# Patient Record
Sex: FEMALE | Race: WHITE | Employment: OTHER | URBAN - METROPOLITAN AREA
[De-identification: names, ages, dates, MRNs, and addresses within clinical notes are randomized per-mention and may not be internally consistent; named-entity substitution may affect disease eponyms.]

---

## 2018-09-07 ENCOUNTER — HOSPITAL ENCOUNTER (EMERGENCY)
Facility: HOSPITAL | Age: 68
Discharge: HOME | End: 2018-09-07
Attending: EMERGENCY MEDICINE
Payer: MEDICARE

## 2018-09-07 ENCOUNTER — APPOINTMENT (OUTPATIENT)
Dept: GENERAL RADIOLOGY | Facility: HOSPITAL | Age: 68
End: 2018-09-07
Attending: EMERGENCY MEDICINE
Payer: MEDICARE

## 2018-09-07 VITALS
HEIGHT: 59 IN | HEART RATE: 106 BPM | TEMPERATURE: 100 F | WEIGHT: 115 LBS | OXYGEN SATURATION: 92 % | RESPIRATION RATE: 21 BRPM | DIASTOLIC BLOOD PRESSURE: 60 MMHG | BODY MASS INDEX: 23.18 KG/M2 | SYSTOLIC BLOOD PRESSURE: 111 MMHG

## 2018-09-07 DIAGNOSIS — J18.9 COMMUNITY ACQUIRED PNEUMONIA OF RIGHT UPPER LOBE OF LUNG: Primary | ICD-10-CM

## 2018-09-07 LAB
ANION GAP SERPL CALC-SCNC: 13 MMOL/L (ref 0–18)
BASOPHILS # BLD: 0 K/UL (ref 0–0.2)
BASOPHILS NFR BLD: 1 %
BUN SERPL-MCNC: 10 MG/DL (ref 8–20)
BUN/CREAT SERPL: 12.2 (ref 10–20)
CALCIUM SERPL-MCNC: 9 MG/DL (ref 8.5–10.5)
CHLORIDE SERPL-SCNC: 94 MMOL/L (ref 95–110)
CO2 SERPL-SCNC: 23 MMOL/L (ref 22–32)
CREAT SERPL-MCNC: 0.82 MG/DL (ref 0.5–1.5)
EOSINOPHIL # BLD: 0 K/UL (ref 0–0.7)
EOSINOPHIL NFR BLD: 0 %
ERYTHROCYTE [DISTWIDTH] IN BLOOD BY AUTOMATED COUNT: 13 % (ref 11–15)
GLUCOSE SERPL-MCNC: 113 MG/DL (ref 70–99)
HCT VFR BLD AUTO: 42.5 % (ref 35–48)
HGB BLD-MCNC: 14.2 G/DL (ref 12–16)
LACTATE SERPL-SCNC: 2 MMOL/L (ref 0.5–2.2)
LYMPHOCYTES # BLD: 0.4 K/UL (ref 1–4)
LYMPHOCYTES NFR BLD: 6 %
MCH RBC QN AUTO: 30.5 PG (ref 27–32)
MCHC RBC AUTO-ENTMCNC: 33.3 G/DL (ref 32–37)
MCV RBC AUTO: 91.4 FL (ref 80–100)
MONOCYTES # BLD: 0.3 K/UL (ref 0–1)
MONOCYTES NFR BLD: 4 %
NEUTROPHILS # BLD AUTO: 5.6 K/UL (ref 1.8–7.7)
NEUTROPHILS NFR BLD: 89 %
OSMOLALITY UR CALC.SUM OF ELEC: 270 MOSM/KG (ref 275–295)
PLATELET # BLD AUTO: 113 K/UL (ref 140–400)
PMV BLD AUTO: 9.7 FL (ref 7.4–10.3)
POTASSIUM SERPL-SCNC: 3.9 MMOL/L (ref 3.3–5.1)
RBC # BLD AUTO: 4.65 M/UL (ref 3.7–5.4)
SODIUM SERPL-SCNC: 130 MMOL/L (ref 136–144)
WBC # BLD AUTO: 6.3 K/UL (ref 4–11)

## 2018-09-07 PROCEDURE — 83605 ASSAY OF LACTIC ACID: CPT | Performed by: EMERGENCY MEDICINE

## 2018-09-07 PROCEDURE — 71045 X-RAY EXAM CHEST 1 VIEW: CPT | Performed by: EMERGENCY MEDICINE

## 2018-09-07 PROCEDURE — 87040 BLOOD CULTURE FOR BACTERIA: CPT | Performed by: EMERGENCY MEDICINE

## 2018-09-07 PROCEDURE — 93010 ELECTROCARDIOGRAM REPORT: CPT | Performed by: EMERGENCY MEDICINE

## 2018-09-07 PROCEDURE — 96365 THER/PROPH/DIAG IV INF INIT: CPT

## 2018-09-07 PROCEDURE — 80048 BASIC METABOLIC PNL TOTAL CA: CPT | Performed by: EMERGENCY MEDICINE

## 2018-09-07 PROCEDURE — 96361 HYDRATE IV INFUSION ADD-ON: CPT

## 2018-09-07 PROCEDURE — 93005 ELECTROCARDIOGRAM TRACING: CPT

## 2018-09-07 PROCEDURE — 85025 COMPLETE CBC W/AUTO DIFF WBC: CPT | Performed by: EMERGENCY MEDICINE

## 2018-09-07 PROCEDURE — 99291 CRITICAL CARE FIRST HOUR: CPT

## 2018-09-07 RX ORDER — ACETAMINOPHEN 500 MG
1000 TABLET ORAL ONCE
Status: COMPLETED | OUTPATIENT
Start: 2018-09-07 | End: 2018-09-07

## 2018-09-07 RX ORDER — IBUPROFEN 600 MG/1
600 TABLET ORAL EVERY 8 HOURS PRN
Qty: 20 TABLET | Refills: 0 | Status: ON HOLD | OUTPATIENT
Start: 2018-09-07 | End: 2018-09-09

## 2018-09-07 RX ORDER — GUAIFENESIN 600 MG
1200 TABLET, EXTENDED RELEASE 12 HR ORAL 2 TIMES DAILY
Qty: 20 TABLET | Refills: 0 | Status: ON HOLD | OUTPATIENT
Start: 2018-09-07 | End: 2018-09-09

## 2018-09-07 RX ORDER — IBUPROFEN 600 MG/1
600 TABLET ORAL ONCE
Status: COMPLETED | OUTPATIENT
Start: 2018-09-07 | End: 2018-09-07

## 2018-09-07 RX ORDER — AZITHROMYCIN 500 MG/1
500 TABLET, FILM COATED ORAL DAILY
Qty: 4 TABLET | Refills: 0 | Status: ON HOLD | OUTPATIENT
Start: 2018-09-08 | End: 2018-09-09

## 2018-09-07 RX ORDER — ROSUVASTATIN CALCIUM 10 MG/1
10 TABLET, COATED ORAL NIGHTLY
COMMUNITY
End: 2018-12-10

## 2018-09-07 NOTE — ED NOTES
Oxygen inhalation started at 2L/min by nasal cannula, Pt oxygen saturation drop to 88 %, Dr Merry Steen aware.

## 2018-09-07 NOTE — ED NOTES
Temp rechecked at 102.7 temporal, Dr Khan Po made aware, give verbal order to medicate pt with Ibuprofen 600 mgs PO.

## 2018-09-07 NOTE — ED PROVIDER NOTES
Patient Seen in: [facility] AND [facility] Emergency Department    History   Patient presents with:  Cough/URI    Stated Complaint: congestion and unable to sleep     HPI    68-year-old female with history of hyperlipidemia here with complaints of increased nasa 132  Resp: 24  Temp: (!) 101.2 °F (38.4 °C)  Temp src: Oral  SpO2: 96 %  O2 Device: None (Room air)    Current:/71   Pulse 119   Temp 99.9 °F (37.7 °C) (Oral)   Resp 22   Ht 149.9 cm (4' 11\")   Wt 52.2 kg   SpO2 91%   BMI 23.23 kg/m²         Physica (from the past 24 hour(s))  -RAINBOW DRAW LAVENDER   Collection Time: 09/07/18  2:37 AM   Result Value Ref Range   Hold Lavender Auto Resulted    -RAINBOW DRAW LIGHT GREEN   Collection Time: 09/07/18  2:37 AM   Result Value Ref Range   Hold Lt Green Auto R Neutrophil Absolute 5.6 1.8 - 7.7 K/UL   Lymphocyte Absolute 0.4 (L) 1.0 - 4.0 K/UL   Monocyte Absolute 0.3 0.0 - 1.0 K/UL   Eosinophil Absolute 0.0 0.0 - 0.7 K/UL   Basophil Absolute 0.0 0.0 - 0.2 K/UL   -LACTIC ACID, PLASMA   Collection Time: 09/07/18 have a problem list on file. to contribute to the complexity of his ED evaluation.     - pt  comfortable with d/c at this time, will d/c pt home now with Rx for azithro, mucinex, and ibuprofen, pt to f/u with Dr. Shaggy Smith in 2 days or return to ED sooner if Fever.  Qty: 20 tablet Refills: 0

## 2018-09-09 ENCOUNTER — APPOINTMENT (OUTPATIENT)
Dept: GENERAL RADIOLOGY | Facility: HOSPITAL | Age: 68
DRG: 193 | End: 2018-09-09
Attending: HOSPITALIST
Payer: MEDICARE

## 2018-09-09 ENCOUNTER — HOSPITAL ENCOUNTER (INPATIENT)
Facility: HOSPITAL | Age: 68
LOS: 4 days | Discharge: HOME | DRG: 193 | End: 2018-09-13
Attending: EMERGENCY MEDICINE | Admitting: INTERNAL MEDICINE
Payer: MEDICARE

## 2018-09-09 ENCOUNTER — APPOINTMENT (OUTPATIENT)
Dept: GENERAL RADIOLOGY | Facility: HOSPITAL | Age: 68
DRG: 193 | End: 2018-09-09
Attending: EMERGENCY MEDICINE
Payer: MEDICARE

## 2018-09-09 DIAGNOSIS — I48.91 ATRIAL FIBRILLATION, NEW ONSET (HCC): Primary | ICD-10-CM

## 2018-09-09 LAB
ANION GAP SERPL CALC-SCNC: 10 MMOL/L (ref 0–18)
APTT PPP: 28.4 SECONDS (ref 23.2–35.3)
APTT PPP: 48.7 SECONDS (ref 23.2–35.3)
BASOPHILS # BLD: 0 K/UL (ref 0–0.2)
BASOPHILS NFR BLD: 0 %
BILIRUB UR QL: NEGATIVE
BUN SERPL-MCNC: 11 MG/DL (ref 8–20)
BUN/CREAT SERPL: 15.7 (ref 10–20)
CALCIUM SERPL-MCNC: 8.8 MG/DL (ref 8.5–10.5)
CHLORIDE SERPL-SCNC: 101 MMOL/L (ref 95–110)
CO2 SERPL-SCNC: 23 MMOL/L (ref 22–32)
COLOR UR: YELLOW
CREAT SERPL-MCNC: 0.7 MG/DL (ref 0.5–1.5)
EOSINOPHIL # BLD: 0 K/UL (ref 0–0.7)
EOSINOPHIL NFR BLD: 0 %
ERYTHROCYTE [DISTWIDTH] IN BLOOD BY AUTOMATED COUNT: 13.6 % (ref 11–15)
GLUCOSE SERPL-MCNC: 130 MG/DL (ref 70–99)
GLUCOSE UR-MCNC: NEGATIVE MG/DL
HCT VFR BLD AUTO: 45.3 % (ref 35–48)
HGB BLD-MCNC: 15.2 G/DL (ref 12–16)
INR BLD: 1 (ref 0.9–1.2)
KETONES UR-MCNC: 20 MG/DL
L PNEUMO AG UR QL: NEGATIVE
LACTATE SERPL-SCNC: 1.8 MMOL/L (ref 0.5–2.2)
LYMPHOCYTES # BLD: 0.8 K/UL (ref 1–4)
LYMPHOCYTES NFR BLD: 15 %
MAGNESIUM SERPL-MCNC: 2.2 MG/DL (ref 1.8–2.5)
MCH RBC QN AUTO: 30.5 PG (ref 27–32)
MCHC RBC AUTO-ENTMCNC: 33.5 G/DL (ref 32–37)
MCV RBC AUTO: 91 FL (ref 80–100)
MONOCYTES # BLD: 0.3 K/UL (ref 0–1)
MONOCYTES NFR BLD: 6 %
NEUTROPHILS # BLD AUTO: 4.2 K/UL (ref 1.8–7.7)
NEUTROPHILS NFR BLD: 79 %
NITRITE UR QL STRIP.AUTO: NEGATIVE
OSMOLALITY UR CALC.SUM OF ELEC: 279 MOSM/KG (ref 275–295)
PH UR: 5 [PH] (ref 5–8)
PLATELET # BLD AUTO: 159 K/UL (ref 140–400)
PMV BLD AUTO: 9.7 FL (ref 7.4–10.3)
POTASSIUM SERPL-SCNC: 3.5 MMOL/L (ref 3.3–5.1)
PROCALCITONIN SERPL-MCNC: 1.81 NG/ML (ref ?–0.11)
PROT UR-MCNC: 100 MG/DL
PROTHROMBIN TIME: 12.7 SECONDS (ref 11.8–14.5)
RBC # BLD AUTO: 4.97 M/UL (ref 3.7–5.4)
RBC #/AREA URNS AUTO: 18 /HPF
SODIUM SERPL-SCNC: 134 MMOL/L (ref 136–144)
SP GR UR STRIP: 1.02 (ref 1–1.03)
TROPONIN I SERPL-MCNC: 0.03 NG/ML (ref ?–0.03)
TSH SERPL-ACNC: 3.27 UIU/ML (ref 0.45–5.33)
UROBILINOGEN UR STRIP-ACNC: 4
VIT C UR-MCNC: 20 MG/DL
WBC # BLD AUTO: 5.4 K/UL (ref 4–11)
WBC #/AREA URNS AUTO: 9 /HPF

## 2018-09-09 PROCEDURE — 96365 THER/PROPH/DIAG IV INF INIT: CPT

## 2018-09-09 PROCEDURE — 93005 ELECTROCARDIOGRAM TRACING: CPT

## 2018-09-09 PROCEDURE — 85730 THROMBOPLASTIN TIME PARTIAL: CPT | Performed by: INTERNAL MEDICINE

## 2018-09-09 PROCEDURE — 71045 X-RAY EXAM CHEST 1 VIEW: CPT | Performed by: EMERGENCY MEDICINE

## 2018-09-09 PROCEDURE — 84443 ASSAY THYROID STIM HORMONE: CPT | Performed by: EMERGENCY MEDICINE

## 2018-09-09 PROCEDURE — 85730 THROMBOPLASTIN TIME PARTIAL: CPT | Performed by: EMERGENCY MEDICINE

## 2018-09-09 PROCEDURE — 99291 CRITICAL CARE FIRST HOUR: CPT

## 2018-09-09 PROCEDURE — 94640 AIRWAY INHALATION TREATMENT: CPT

## 2018-09-09 PROCEDURE — 84145 PROCALCITONIN (PCT): CPT | Performed by: HOSPITALIST

## 2018-09-09 PROCEDURE — 99292 CRITICAL CARE ADDL 30 MIN: CPT

## 2018-09-09 PROCEDURE — 85610 PROTHROMBIN TIME: CPT | Performed by: EMERGENCY MEDICINE

## 2018-09-09 PROCEDURE — 93010 ELECTROCARDIOGRAM REPORT: CPT | Performed by: EMERGENCY MEDICINE

## 2018-09-09 PROCEDURE — 94668 MNPJ CHEST WALL SBSQ: CPT

## 2018-09-09 PROCEDURE — 81001 URINALYSIS AUTO W/SCOPE: CPT | Performed by: INTERNAL MEDICINE

## 2018-09-09 PROCEDURE — 85025 COMPLETE CBC W/AUTO DIFF WBC: CPT | Performed by: EMERGENCY MEDICINE

## 2018-09-09 PROCEDURE — 96368 THER/DIAG CONCURRENT INF: CPT

## 2018-09-09 PROCEDURE — 87449 NOS EACH ORGANISM AG IA: CPT | Performed by: INTERNAL MEDICINE

## 2018-09-09 PROCEDURE — 71045 X-RAY EXAM CHEST 1 VIEW: CPT | Performed by: HOSPITALIST

## 2018-09-09 PROCEDURE — 84484 ASSAY OF TROPONIN QUANT: CPT | Performed by: EMERGENCY MEDICINE

## 2018-09-09 PROCEDURE — 83735 ASSAY OF MAGNESIUM: CPT | Performed by: EMERGENCY MEDICINE

## 2018-09-09 PROCEDURE — 87086 URINE CULTURE/COLONY COUNT: CPT | Performed by: INTERNAL MEDICINE

## 2018-09-09 PROCEDURE — 80048 BASIC METABOLIC PNL TOTAL CA: CPT | Performed by: EMERGENCY MEDICINE

## 2018-09-09 PROCEDURE — 83605 ASSAY OF LACTIC ACID: CPT | Performed by: HOSPITALIST

## 2018-09-09 PROCEDURE — 94667 MNPJ CHEST WALL 1ST: CPT

## 2018-09-09 PROCEDURE — 96366 THER/PROPH/DIAG IV INF ADDON: CPT

## 2018-09-09 RX ORDER — DILTIAZEM HYDROCHLORIDE 5 MG/ML
10 INJECTION INTRAVENOUS ONCE
Status: COMPLETED | OUTPATIENT
Start: 2018-09-09 | End: 2018-09-09

## 2018-09-09 RX ORDER — HEPARIN SODIUM AND DEXTROSE 10000; 5 [USP'U]/100ML; G/100ML
12 INJECTION INTRAVENOUS ONCE
Status: COMPLETED | OUTPATIENT
Start: 2018-09-09 | End: 2018-09-09

## 2018-09-09 RX ORDER — DILTIAZEM HYDROCHLORIDE 5 MG/ML
INJECTION INTRAVENOUS
Status: COMPLETED
Start: 2018-09-09 | End: 2018-09-09

## 2018-09-09 RX ORDER — ROSUVASTATIN CALCIUM 5 MG/1
10 TABLET, COATED ORAL NIGHTLY
Status: DISCONTINUED | OUTPATIENT
Start: 2018-09-09 | End: 2018-09-13

## 2018-09-09 RX ORDER — IPRATROPIUM BROMIDE AND ALBUTEROL SULFATE 2.5; .5 MG/3ML; MG/3ML
3 SOLUTION RESPIRATORY (INHALATION)
Status: DISCONTINUED | OUTPATIENT
Start: 2018-09-09 | End: 2018-09-13

## 2018-09-09 RX ORDER — DILTIAZEM HYDROCHLORIDE 60 MG/1
60 TABLET, FILM COATED ORAL AS NEEDED
Status: DISCONTINUED | OUTPATIENT
Start: 2018-09-09 | End: 2018-09-13

## 2018-09-09 RX ORDER — SODIUM CHLORIDE 9 MG/ML
INJECTION, SOLUTION INTRAVENOUS CONTINUOUS
Status: DISCONTINUED | OUTPATIENT
Start: 2018-09-09 | End: 2018-09-09

## 2018-09-09 RX ORDER — AZITHROMYCIN 250 MG/1
250 TABLET, FILM COATED ORAL
Status: COMPLETED | OUTPATIENT
Start: 2018-09-10 | End: 2018-09-11

## 2018-09-09 RX ORDER — ONDANSETRON 2 MG/ML
4 INJECTION INTRAMUSCULAR; INTRAVENOUS EVERY 6 HOURS PRN
Status: DISCONTINUED | OUTPATIENT
Start: 2018-09-09 | End: 2018-09-13

## 2018-09-09 RX ORDER — SODIUM CHLORIDE 0.9 % (FLUSH) 0.9 %
3 SYRINGE (ML) INJECTION AS NEEDED
Status: DISCONTINUED | OUTPATIENT
Start: 2018-09-09 | End: 2018-09-13

## 2018-09-09 RX ORDER — AZITHROMYCIN 250 MG/1
500 TABLET, FILM COATED ORAL
Status: DISCONTINUED | OUTPATIENT
Start: 2018-09-10 | End: 2018-09-09

## 2018-09-09 RX ORDER — ARIPIPRAZOLE 15 MG/1
40 TABLET ORAL EVERY 4 HOURS
Status: DISPENSED | OUTPATIENT
Start: 2018-09-09 | End: 2018-09-09

## 2018-09-09 RX ORDER — SODIUM CHLORIDE 9 MG/ML
INJECTION, SOLUTION INTRAVENOUS CONTINUOUS
Status: DISCONTINUED | OUTPATIENT
Start: 2018-09-09 | End: 2018-09-10

## 2018-09-09 RX ORDER — HEPARIN SODIUM AND DEXTROSE 10000; 5 [USP'U]/100ML; G/100ML
INJECTION INTRAVENOUS CONTINUOUS
Status: DISCONTINUED | OUTPATIENT
Start: 2018-09-09 | End: 2018-09-09

## 2018-09-09 RX ORDER — DIGOXIN 250 MCG
250 TABLET ORAL ONCE
Status: COMPLETED | OUTPATIENT
Start: 2018-09-09 | End: 2018-09-09

## 2018-09-09 RX ORDER — ACETAMINOPHEN 325 MG/1
650 TABLET ORAL EVERY 6 HOURS PRN
Status: DISCONTINUED | OUTPATIENT
Start: 2018-09-09 | End: 2018-09-13

## 2018-09-09 RX ORDER — HEPARIN SODIUM 1000 [USP'U]/ML
60 INJECTION, SOLUTION INTRAVENOUS; SUBCUTANEOUS ONCE
Status: COMPLETED | OUTPATIENT
Start: 2018-09-09 | End: 2018-09-09

## 2018-09-09 RX ORDER — POTASSIUM CHLORIDE 20 MEQ/1
40 TABLET, EXTENDED RELEASE ORAL EVERY 4 HOURS
Status: DISPENSED | OUTPATIENT
Start: 2018-09-09 | End: 2018-09-09

## 2018-09-09 NOTE — ED PROVIDER NOTES
Patient Seen in: [facility] AND [facility] Emergency Department    History   Patient presents with:  Arrythmia/Palpitations (cardiovascular)  Dyspnea RITESH SOB (respiratory)    Stated Complaint: Patient has pneumonia. Complains of shortness of breath.     HPI    69 distress. HENT:   Head: Normocephalic and atraumatic. Eyes: Conjunctivae and EOM are normal. Pupils are equal, round, and reactive to light. Neck: Normal range of motion and full passive range of motion without pain. Neck supple. No neck rigidity.  No Final result                 Please view results for these tests on the individual orders.    URINALYSIS WITH CULTURE REFLEX   RAINBOW DRAW BLUE   RAINBOW DRAW LAVENDER   RAINBOW DRAW DARK GREEN   RAINBOW DRAW LIGHT GREEN   RAINBOW DRAW GOLD   RAINBOW DRAW Sodium (Porcine) 1000 UNIT/ML injection 3,140 Units (3,324 Units Intravenous Given 9/9/18 2615)   digoxin (LANOXIN) tab 250 mcg (250 mcg Oral Given 9/9/18 7663)         Admission disposition: 9/9/2018  5:22 AM       d/w Dr Citlaly Valenzuela - will consult, advise

## 2018-09-09 NOTE — H&P
[facility] Hospitalist H&P       CC: Patient presents with:  Arrythmia/Palpitations (cardiovascular)  Dyspnea RITESH SOB (respiratory)       PCP: Bryon Credit    History of Present Illness: Patient is a 69year old female with PMH sig for HLD, who presents Sclera anicteric, No conjunctival pallor, EOMs intact. Nose: Nares normal. Septum midline. Mucosa normal. No drainage.    Throat: Lips, mucosa, and tongue normal. Teeth and gums normal.   Neck: Supple    Lungs:   Wheezes rales and rhonchi   Chest wall: ceftriaxone  - azithro D3/5, cef D2/7  - nebs  - acapella, IS  - wean O2  - pulm consulted    Afib with RVR  - rate control with IV dilt  - wean to oral dilt as able  - was on heparin drip, DC start oral eliquis per cards  - [id number] of 2, eliquis today

## 2018-09-09 NOTE — CONSULTS
I was asked by Dr. Berry Ours to evaluate for AF/RVR    69year old female with PMHx of HL with recent diagnosis of CAP on abx who presents with weeks of intermittent palpitatons and found to have AF/RVR in ED.     Started on dilt gtt and AF converted to sinus ov 80-90s  General: Alert and oriented in no apparent distress. HEENT: No focal deficits. Neck: No JVD, carotids 2+ no bruits. Cardiac: Regular rate and rhythm, S1, S2 normal, no murmur, rub or gallop.   Lungs: rhonchi throughtout + cough  Abdomen: Soft, no

## 2018-09-09 NOTE — ED INITIAL ASSESSMENT (HPI)
The patient who was diagnosed with right upper lobe pneumonia here 2 days ago now complains of increased SOB and palpitations 1 hour prior to arrival.

## 2018-09-09 NOTE — CONSULTS
[facility] Pulmonary, Critical Care and Sleep    Leydi Zaragoza Patient Status:  Inpatient    1950 MRN [id number]   Location 329/329-A PCP Yazmin Woodall     Date of Admission: 2018  History of Present Illness: Pt is a 69year old female cons /60 (BP Location: Right arm)   Pulse 80   Temp 99.3 °F (37.4 °C) (Oral)   Resp 16   Ht 4' 11\" (1.499 m)   Wt 116 lb 11.2 oz (52.9 kg)   SpO2 91%   BMI 23.57 kg/m²   O2: 2 LNC  General: NAD. Neuro: Alert, no focal deficits.     HEENT: PERRL  Neck : RAINBOW DRAW LIGHT GREEN    Collection Time: 09/09/18  2:49 AM   Result Value Ref Range    Hold Lt Green Auto Resulted    RAINBOW DRAW GOLD    Collection Time: 09/09/18  2:49 AM   Result Value Ref Range    Hold Gold Auto Resulted    RAINBOW DRAW LAVENDER T

## 2018-09-09 NOTE — ED NOTES
Pts heart rhythm continues to be irregular with rate appx . Suspected to be mild improvement over previous rate range of 120-160 after Cardizem Bolus.

## 2018-09-10 ENCOUNTER — APPOINTMENT (OUTPATIENT)
Dept: GENERAL RADIOLOGY | Facility: HOSPITAL | Age: 68
DRG: 193 | End: 2018-09-10
Attending: INTERNAL MEDICINE
Payer: MEDICARE

## 2018-09-10 ENCOUNTER — APPOINTMENT (OUTPATIENT)
Dept: CV DIAGNOSTICS | Facility: HOSPITAL | Age: 68
DRG: 193 | End: 2018-09-10
Attending: HOSPITALIST
Payer: MEDICARE

## 2018-09-10 LAB
ANION GAP SERPL CALC-SCNC: 10 MMOL/L (ref 0–18)
BASOPHILS # BLD: 0 K/UL (ref 0–0.2)
BASOPHILS NFR BLD: 0 %
BUN SERPL-MCNC: 6 MG/DL (ref 8–20)
BUN/CREAT SERPL: 11.5 (ref 10–20)
CALCIUM SERPL-MCNC: 8.1 MG/DL (ref 8.5–10.5)
CHLORIDE SERPL-SCNC: 102 MMOL/L (ref 95–110)
CHOLEST SERPL-MCNC: 112 MG/DL (ref 110–200)
CO2 SERPL-SCNC: 21 MMOL/L (ref 22–32)
CREAT SERPL-MCNC: 0.52 MG/DL (ref 0.5–1.5)
EOSINOPHIL # BLD: 0 K/UL (ref 0–0.7)
EOSINOPHIL NFR BLD: 0 %
ERYTHROCYTE [DISTWIDTH] IN BLOOD BY AUTOMATED COUNT: 13.1 % (ref 11–15)
GLUCOSE SERPL-MCNC: 127 MG/DL (ref 70–99)
HCT VFR BLD AUTO: 37.2 % (ref 35–48)
HDLC SERPL-MCNC: 15 MG/DL
HGB BLD-MCNC: 12.6 G/DL (ref 12–16)
LDLC SERPL CALC-MCNC: 58 MG/DL (ref 0–99)
LYMPHOCYTES # BLD: 1.2 K/UL (ref 1–4)
LYMPHOCYTES NFR BLD: 26 %
MAGNESIUM SERPL-MCNC: 2 MG/DL (ref 1.8–2.5)
MCH RBC QN AUTO: 30.5 PG (ref 27–32)
MCHC RBC AUTO-ENTMCNC: 33.8 G/DL (ref 32–37)
MCV RBC AUTO: 90.4 FL (ref 80–100)
MONOCYTES # BLD: 0.5 K/UL (ref 0–1)
MONOCYTES NFR BLD: 11 %
NEUTROPHILS # BLD AUTO: 2.8 K/UL (ref 1.8–7.7)
NEUTROPHILS NFR BLD: 62 %
NONHDLC SERPL-MCNC: 97 MG/DL
OSMOLALITY UR CALC.SUM OF ELEC: 275 MOSM/KG (ref 275–295)
PLATELET # BLD AUTO: 181 K/UL (ref 140–400)
PMV BLD AUTO: 8.9 FL (ref 7.4–10.3)
POTASSIUM SERPL-SCNC: 3.9 MMOL/L (ref 3.3–5.1)
RBC # BLD AUTO: 4.11 M/UL (ref 3.7–5.4)
SODIUM SERPL-SCNC: 133 MMOL/L (ref 136–144)
TRIGL SERPL-MCNC: 194 MG/DL (ref 1–149)
WBC # BLD AUTO: 4.5 K/UL (ref 4–11)

## 2018-09-10 PROCEDURE — 80061 LIPID PANEL: CPT | Performed by: HOSPITALIST

## 2018-09-10 PROCEDURE — 85025 COMPLETE CBC W/AUTO DIFF WBC: CPT | Performed by: HOSPITALIST

## 2018-09-10 PROCEDURE — 93306 TTE W/DOPPLER COMPLETE: CPT | Performed by: HOSPITALIST

## 2018-09-10 PROCEDURE — 71045 X-RAY EXAM CHEST 1 VIEW: CPT | Performed by: INTERNAL MEDICINE

## 2018-09-10 PROCEDURE — 80048 BASIC METABOLIC PNL TOTAL CA: CPT | Performed by: HOSPITALIST

## 2018-09-10 PROCEDURE — 83735 ASSAY OF MAGNESIUM: CPT | Performed by: HOSPITALIST

## 2018-09-10 PROCEDURE — A4216 STERILE WATER/SALINE, 10 ML: HCPCS | Performed by: HOSPITALIST

## 2018-09-10 PROCEDURE — 94640 AIRWAY INHALATION TREATMENT: CPT

## 2018-09-10 PROCEDURE — 94668 MNPJ CHEST WALL SBSQ: CPT

## 2018-09-10 RX ORDER — DILTIAZEM HYDROCHLORIDE 60 MG/1
60 TABLET, FILM COATED ORAL EVERY 6 HOURS SCHEDULED
Status: DISCONTINUED | OUTPATIENT
Start: 2018-09-10 | End: 2018-09-12

## 2018-09-10 RX ORDER — ECHINACEA PURPUREA EXTRACT 125 MG
1 TABLET ORAL
Status: DISCONTINUED | OUTPATIENT
Start: 2018-09-10 | End: 2018-09-13

## 2018-09-10 RX ORDER — METOPROLOL TARTRATE 5 MG/5ML
5 INJECTION INTRAVENOUS
Status: DISCONTINUED | OUTPATIENT
Start: 2018-09-10 | End: 2018-09-13

## 2018-09-10 NOTE — PROGRESS NOTES
[facility] Hospitalist Progress Note     CC: Hospital Follow up    PCP: Alfonso Dawson       Assessment/Plan:     Principal Problem:    Atrial fibrillation, new onset [facility]    Patient is a 69year old female with PMH sig for HLD, who presents with cough con Skin: no rashes or lesions  Neuro: AO*3, motor intact, no sensory deficits  Psyc: appropriate mood and affect      Data Review:       Labs:     Recent Labs   Lab  09/07/18   0237  09/09/18   0249  09/10/18   0531   RBC  4.65  4.97  4.11   HGB  14.2  15. 2 change. Continued followup is recommended to document resolution. A preliminary report was issued by the [address] [place] [address] Radiology teleradiology service. There are no major discrepancies.    Dictated by (CST): Francois Rivera MD on 9/09/2018 at 12:32     Approved

## 2018-09-10 NOTE — PROGRESS NOTES
ASSESSMENT/PLAN:     IMP:  1. Atrial fib; chads 2; new onset and now converted to sinus    2. Pneumonia on antibiotics and better    3.  Lipid disorder    rec  Po dilt  eliquis  Check echo  ambulate    Reviewed with pt in detail      --------------------- EYES:conjunctiva not injected, no xanthelasma. ENT:mucosa pink and moist. NECK:jugular venous pressure not elevated. RESP:normal rate and rhythm, right base crackles GI:soft, non-tender;rectal deferred. MS:adequate gait for exercise testing.  EXT:no clubbin

## 2018-09-11 LAB
ANION GAP SERPL CALC-SCNC: 8 MMOL/L (ref 0–18)
BASOPHILS # BLD: 0 K/UL (ref 0–0.2)
BASOPHILS NFR BLD: 0 %
BUN SERPL-MCNC: 6 MG/DL (ref 8–20)
BUN/CREAT SERPL: 9.7 (ref 10–20)
CALCIUM SERPL-MCNC: 8.3 MG/DL (ref 8.5–10.5)
CHLORIDE SERPL-SCNC: 101 MMOL/L (ref 95–110)
CO2 SERPL-SCNC: 24 MMOL/L (ref 22–32)
CREAT SERPL-MCNC: 0.62 MG/DL (ref 0.5–1.5)
EOSINOPHIL # BLD: 0 K/UL (ref 0–0.7)
EOSINOPHIL NFR BLD: 0 %
ERYTHROCYTE [DISTWIDTH] IN BLOOD BY AUTOMATED COUNT: 13.3 % (ref 11–15)
GLUCOSE SERPL-MCNC: 130 MG/DL (ref 70–99)
HCT VFR BLD AUTO: 37.6 % (ref 35–48)
HGB BLD-MCNC: 12.6 G/DL (ref 12–16)
LYMPHOCYTES # BLD: 1.2 K/UL (ref 1–4)
LYMPHOCYTES NFR BLD: 20 %
MCH RBC QN AUTO: 30.5 PG (ref 27–32)
MCHC RBC AUTO-ENTMCNC: 33.5 G/DL (ref 32–37)
MCV RBC AUTO: 90.9 FL (ref 80–100)
MONOCYTES # BLD: 0.5 K/UL (ref 0–1)
MONOCYTES NFR BLD: 8 %
NEUTROPHILS # BLD AUTO: 3.9 K/UL (ref 1.8–7.7)
NEUTROPHILS NFR BLD: 64 %
NEUTS BAND NFR BLD: 6 %
OSMOLALITY UR CALC.SUM OF ELEC: 275 MOSM/KG (ref 275–295)
PLATELET # BLD AUTO: 202 K/UL (ref 140–400)
PMV BLD AUTO: 8.2 FL (ref 7.4–10.3)
POTASSIUM SERPL-SCNC: 4.1 MMOL/L (ref 3.3–5.1)
RBC # BLD AUTO: 4.14 M/UL (ref 3.7–5.4)
SODIUM SERPL-SCNC: 133 MMOL/L (ref 136–144)
VARIANT LYMPHS NFR BLD MANUAL: 2 %
WBC # BLD AUTO: 5.6 K/UL (ref 4–11)

## 2018-09-11 PROCEDURE — 85007 BL SMEAR W/DIFF WBC COUNT: CPT | Performed by: HOSPITALIST

## 2018-09-11 PROCEDURE — 80048 BASIC METABOLIC PNL TOTAL CA: CPT | Performed by: HOSPITALIST

## 2018-09-11 PROCEDURE — 85027 COMPLETE CBC AUTOMATED: CPT | Performed by: HOSPITALIST

## 2018-09-11 PROCEDURE — 85025 COMPLETE CBC W/AUTO DIFF WBC: CPT | Performed by: HOSPITALIST

## 2018-09-11 PROCEDURE — 94668 MNPJ CHEST WALL SBSQ: CPT

## 2018-09-11 PROCEDURE — A4216 STERILE WATER/SALINE, 10 ML: HCPCS

## 2018-09-11 PROCEDURE — 94640 AIRWAY INHALATION TREATMENT: CPT

## 2018-09-11 RX ORDER — 0.9 % SODIUM CHLORIDE 0.9 %
VIAL (ML) INJECTION
Status: DISPENSED
Start: 2018-09-11 | End: 2018-09-11

## 2018-09-11 NOTE — PROGRESS NOTES
[facility] Hospitalist Progress Note     CC: Hospital Follow up    PCP: Asia Lopez       Assessment/Plan:     Principal Problem:    Atrial fibrillation, new onset [facility])    Patient is a 69year old female with PMH sig for HLD, who presents with cough con nondistended   MSK: Full range of motion in extremities   Skin: no rashes or lesions  Neuro: AO*3, motor intact, no sensory deficits  Psyc: appropriate mood and affect      Data Review:       Labs:     Recent Labs   Lab  09/09/18   0249  09/10/18   0531  0 favored to represent pneumonia given the short interval change. Continued followup is recommended to document resolution. A preliminary report was issued by the [address] [place] [address] Radiology teleradiology service. There are no major discrepancies.    Dictated by (CST

## 2018-09-11 NOTE — PROGRESS NOTES
Assessment and Plan:     1. Atrial fibrillation, paroxysmal  - in setting of pneumonia  - Echo: EF 60%; no significant valve disease  2. Pneumonia  3.  HLD    PLAN:  - Eliquis  - diltiazem--now oral      Subjective:     Feels better    Objective:   Temp: 14:41          Xr Chest Ap Portable  (cpt=71045)    Result Date: 9/9/2018  CONCLUSION:  Progressive worsening of medial right upper lobe airspace disease, favored to represent pneumonia. Followup is recommended to document resolution.      Dictated by (CST)

## 2018-09-11 NOTE — PROGRESS NOTES
Pulmonary Progress Note      NAME: Maribell Avalos - ROOM: [facility]- - MRN: [id number] - Age: 69year old - : 1950    Assessment/Plan:  1. Acute hypoxic respiratory failure - due to PNA.  Improved supplemental oxygen requirements today  - wean hum 0. 52  0.62   GFRAA  >60  >60  >60   GFRNAA  >60  >60  >60   CA  8.8  8.1*  8.3*   NA  134*  133*  133*   K  3.5  3.9  4.1   CL  101  102  101   CO2  23  21*  24     Imaging: I independently visualized all relevant chest imaging in PACS, agree with radiolog

## 2018-09-12 LAB
ANION GAP SERPL CALC-SCNC: 10 MMOL/L (ref 0–18)
BUN SERPL-MCNC: 8 MG/DL (ref 8–20)
BUN/CREAT SERPL: 12.7 (ref 10–20)
CALCIUM SERPL-MCNC: 8.6 MG/DL (ref 8.5–10.5)
CHLORIDE SERPL-SCNC: 101 MMOL/L (ref 95–110)
CO2 SERPL-SCNC: 23 MMOL/L (ref 22–32)
CREAT SERPL-MCNC: 0.63 MG/DL (ref 0.5–1.5)
GLUCOSE SERPL-MCNC: 125 MG/DL (ref 70–99)
MAGNESIUM SERPL-MCNC: 2.2 MG/DL (ref 1.8–2.5)
OSMOLALITY UR CALC.SUM OF ELEC: 278 MOSM/KG (ref 275–295)
POTASSIUM SERPL-SCNC: 4.1 MMOL/L (ref 3.3–5.1)
SODIUM SERPL-SCNC: 134 MMOL/L (ref 136–144)

## 2018-09-12 PROCEDURE — 94640 AIRWAY INHALATION TREATMENT: CPT

## 2018-09-12 PROCEDURE — 83735 ASSAY OF MAGNESIUM: CPT | Performed by: HOSPITALIST

## 2018-09-12 PROCEDURE — 80048 BASIC METABOLIC PNL TOTAL CA: CPT | Performed by: HOSPITALIST

## 2018-09-12 RX ORDER — DILTIAZEM HYDROCHLORIDE 240 MG/1
240 CAPSULE, COATED, EXTENDED RELEASE ORAL DAILY
Status: DISCONTINUED | OUTPATIENT
Start: 2018-09-12 | End: 2018-09-13

## 2018-09-12 NOTE — PROGRESS NOTES
[facility] Hospitalist Progress Note     CC: Hospital Follow up    PCP: Elena Montoya       Assessment/Plan:     Principal Problem:    Atrial fibrillation, new onset [facility])    Patient is a 69year old female with PMH sig for HLD, who presents with cough con distress, alert and oriented x3   Heent: NC AT, mucous memb karolyn   Pulm: Lungs rales and rhonchi in right upper lobe  CV: RRR  Abd: Abdomen soft, nontender, nondistended   MSK: Full range of motion in extremities   Skin: no rashes or lesions  Neuro: AO*3, Meds:     • DilTIAZem HCl ER Coated Beads  240 mg Oral Daily   • Rosuvastatin Calcium  10 mg Oral Nightly   • cefTRIAXone  1 g Intravenous Q24H   • ipratropium-albuterol  3 mL Nebulization Q6H WA   • apixaban  5 mg Oral BID     • diltiazem Stopped

## 2018-09-12 NOTE — PROGRESS NOTES
Assessment and Plan:     1. Atrial fibrillation, paroxysmal  - in setting of pneumonia  - Echo: EF 60%; no significant valve disease  2. Pneumonia, on abx per IM  3.  HLD    PLAN:    - continue Eliquis (samples given)  - change cardizem to long acting  - lesion cannot be excluded.  Recommend chest CT to exclude proximal hilar mass    Dictated by (CST): Dia Blanton MD on 9/10/2018 at 14:35     Approved by (CST): Dia Blanton MD on 9/10/2018 at 14:41                    TIMOTEO Brewer

## 2018-09-12 NOTE — PROGRESS NOTES
Pulmonary Progress Note     Assessment / Plan:  1. Acute hypoxic respiratory failure - due to PNA. Improved supplemental oxygen requirements today  - wean humidified O2 as able  - abx as below  2. Community acquired pneumonia  - cont ceftriaxone.  luís spears

## 2018-09-13 VITALS
BODY MASS INDEX: 23.16 KG/M2 | OXYGEN SATURATION: 95 % | HEART RATE: 97 BPM | WEIGHT: 114.88 LBS | SYSTOLIC BLOOD PRESSURE: 132 MMHG | TEMPERATURE: 99 F | HEIGHT: 59 IN | RESPIRATION RATE: 20 BRPM | DIASTOLIC BLOOD PRESSURE: 81 MMHG

## 2018-09-13 LAB
ANION GAP SERPL CALC-SCNC: 10 MMOL/L (ref 0–18)
BUN SERPL-MCNC: 13 MG/DL (ref 8–20)
BUN/CREAT SERPL: 22.4 (ref 10–20)
CALCIUM SERPL-MCNC: 8.8 MG/DL (ref 8.5–10.5)
CHLORIDE SERPL-SCNC: 102 MMOL/L (ref 95–110)
CO2 SERPL-SCNC: 22 MMOL/L (ref 22–32)
CREAT SERPL-MCNC: 0.58 MG/DL (ref 0.5–1.5)
GLUCOSE SERPL-MCNC: 115 MG/DL (ref 70–99)
OSMOLALITY UR CALC.SUM OF ELEC: 279 MOSM/KG (ref 275–295)
POTASSIUM SERPL-SCNC: 4.3 MMOL/L (ref 3.3–5.1)
SODIUM SERPL-SCNC: 134 MMOL/L (ref 136–144)

## 2018-09-13 PROCEDURE — 80048 BASIC METABOLIC PNL TOTAL CA: CPT | Performed by: HOSPITALIST

## 2018-09-13 PROCEDURE — 94640 AIRWAY INHALATION TREATMENT: CPT

## 2018-09-13 RX ORDER — DILTIAZEM HYDROCHLORIDE 240 MG/1
240 CAPSULE, COATED, EXTENDED RELEASE ORAL DAILY
Qty: 30 CAPSULE | Refills: 0 | Status: SHIPPED | OUTPATIENT
Start: 2018-09-13 | End: 2018-09-26

## 2018-09-13 RX ORDER — CEFDINIR 300 MG/1
300 CAPSULE ORAL 2 TIMES DAILY
Qty: 6 CAPSULE | Refills: 0 | Status: SHIPPED | OUTPATIENT
Start: 2018-09-13 | End: 2018-09-20 | Stop reason: ALTCHOICE

## 2018-09-13 NOTE — PLAN OF CARE
CARDIOVASCULAR - ADULT    • Maintains optimal cardiac output and hemodynamic stability Adequate for Discharge    • Absence of cardiac arrhythmias or at baseline Adequate for Discharge        Patient Centered Care    • Patient preferences are identified and

## 2018-09-13 NOTE — DISCHARGE SUMMARY
General Medicine Discharge Summary     Patient ID:  Patricia Chamorro  69year old  12/8/1950    Admit date: 9/9/2018    Discharge date and time: 9/13/2018  1:48 PM     Attending Physician: No att. providers found     Consults: IP CONSULT TO PULMONOLOGY oral ab's, will fu with pulm, also noted to have afib with RVR, rates controlled with oral dilt, started on eliquis as well, DC home with PCP, PULM, CARDS FU.   See below for details.        Community acquired PNA  - CXR with Right upper lobe PNA  - was on [address] Jahaira Elise MD. Schedule an appointment as soon as possible for a visit in 1 week.     Specialties:  Cardiovascular Diseases, CARDIOLOGY  Contact information:  [address] [facility] [address].

## 2018-09-13 NOTE — PROGRESS NOTES
Assessment and Plan:     1. Atrial fibrillation, paroxysmal  - in setting of pneumonia  - Echo: EF 60%; no significant valve disease  2. Pneumonia, on abx per IM  3. HLD    PLAN:    -ok to dc  has appt next week with cardiology  ?  answered      Subjectiv

## 2018-09-13 NOTE — PROGRESS NOTES
Pulmonary Progress Note      NAME: Sonia Gonzales - ROOM: 849/[facility]G - MRN: [id number] - Age: 69year old - : 1950    Assessment/Plan:  1. Acute hypoxic respiratory failure - due to PNA. Resolved  - on RA  - abx as below  2.  Community acquired pn 13   CREATSERUM  0.62  0.63  0.58   GFRAA  >60  >60  >60   GFRNAA  >60  >60  >60   CA  8.3*  8.6  8.8   NA  133*  134*  134*   K  4.1  4.1  4.3   CL  101  101  102   CO2  24  23  22     Imaging: I independently visualized all relevant chest imaging in [facility]

## 2018-09-13 NOTE — PAYOR COMM NOTE
Admit Orders (From admission, onward)    Start     Ordered    09/09/18 0643  Admit to inpatient Once  ([address] [facility] [address] Cardiovascular)  Once     Ordering Provider:  Xochitl Tillman MD   Question Answer Comment   Admitting Physician Anatoliy Tapia with:  Arrythmia/Palpitations (cardiovascular)  Dyspnea RITESH SOB (respiratory)    Stated Complaint: Patient has pneumonia. Complains of shortness of breath.     HPI    69year old female with a past medical history of hyperlipidemia and recent diagnosis of c are normal. Pupils are equal, round, and reactive to light. Neck: Normal range of motion and full passive range of motion without pain. Neck supple. No neck rigidity. Normal range of motion present. Cardiovascular: Intact distal pulses.  An irregularly orders. URINALYSIS WITH CULTURE REFLEX   RAINBOW DRAW BLUE   RAINBOW DRAW LAVENDER   RAINBOW DRAW DARK GREEN   RAINBOW DRAW LIGHT GREEN   RAINBOW DRAW GOLD   RAINBOW DRAW LAVENDER TALL (BNP)     EKG    Rate, intervals and axes as noted on EKG Report.   Ra 9/9/18 0455)   digoxin (LANOXIN) tab 250 mcg (250 mcg Oral Given 9/9/18 0433)         Admission disposition: 9/9/2018  5:22 AM       d/w Dr Ortiz Read - will consult, advises give oral digoxin  D/w Dr Olivia Abbasi - will admit    CXR later read as worsening R complaints. Still coughing, but palpitations have improved. Of note her son was just in the hospital (taken care of by our service) last week.         PMH  Past Medical History:   Diagnosis Date   • Hyperlipidemia         PSH  Past Surgical History: MCV  91.4  91.0   PLT  113*  159   INR   --   1.0       Recent Labs   Lab  09/07/18   0237  09/09/18   0249   NA  130*  134*   K  3.9  3.5   CL  94*  101   CO2  23  23   BUN  10  11   CREATSERUM  0.82  0.70   GLU  113*  130*   CA  9.0  8.8   MG   --   2. therapeutic plan as outlined    Thank Dave Munoz MD    [facility] Hospitalist  Answering Service number: [phone]      Electronically signed by Rosemary Em MD on 9/9/2018  1:40 PM         MEDICATIONS ADMINISTERED IN LAST 1 DAY:  apixaban (ELIQUIS) tab 5 m

## 2018-09-14 ENCOUNTER — TELEPHONE (OUTPATIENT)
Dept: CARDIOLOGY UNIT | Facility: HOSPITAL | Age: 68
End: 2018-09-14

## 2018-09-17 ENCOUNTER — TELEPHONE (OUTPATIENT)
Dept: MEDSURG UNIT | Facility: HOSPITAL | Age: 68
End: 2018-09-17

## 2018-09-18 NOTE — PROGRESS NOTES
[address] [facility] Patient Status:  No patient class for patient encounter    1950 MRN [id number]   Location [address] [place] MD Zachary Nascimento MD [place], 09/20/2018 09:52 AM    BUN 14 09/20/2018 09:52 AM     (L) 09/20/2018 09:52 AM    K 4.4 09/20/2018 09:52 AM     09/20/2018 09:52 AM    CO2 20 (L) 09/20/2018 09:52 AM     (H) 09/20/2018 09:52 AM    CA 9.4 09/20/2018 09:52 AM    PTT 48.7 (H coordination of care and education given. Patient receptive. Assessment:  Community acquired pneumonia, RUL  -symptoms greatly improved after completing cefdinir, cough decreased, no poe walking  -no hypoxemia walking 475 feet, sa02 98% on RA.    -WBC/hb chest pain, lightheadedness, or significant shortness of breath        Current Outpatient Medications:   •  apixaban 5 MG Oral Tab, Take 1 tablet (5 mg total) by mouth 2 (two) times daily. , Disp: 60 tablet, Rfl: 0  •  DilTIAZem HCl ER Coated Beads 240 MG O

## 2018-09-20 ENCOUNTER — OFFICE VISIT (OUTPATIENT)
Dept: CARDIOLOGY CLINIC | Facility: HOSPITAL | Age: 68
End: 2018-09-20
Attending: INTERNAL MEDICINE
Payer: MEDICARE

## 2018-09-20 VITALS
SYSTOLIC BLOOD PRESSURE: 135 MMHG | WEIGHT: 115.63 LBS | HEART RATE: 92 BPM | OXYGEN SATURATION: 98 % | BODY MASS INDEX: 23 KG/M2 | DIASTOLIC BLOOD PRESSURE: 66 MMHG

## 2018-09-20 DIAGNOSIS — I48.0 PAROXYSMAL ATRIAL FIBRILLATION (HCC): ICD-10-CM

## 2018-09-20 DIAGNOSIS — J18.9 PNA (PNEUMONIA): ICD-10-CM

## 2018-09-20 DIAGNOSIS — Z91.89 AT RISK FOR SLEEP APNEA: ICD-10-CM

## 2018-09-20 DIAGNOSIS — J18.9 COMMUNITY ACQUIRED PNEUMONIA OF RIGHT UPPER LOBE OF LUNG: Primary | ICD-10-CM

## 2018-09-20 DIAGNOSIS — R00.2 PALPITATIONS: ICD-10-CM

## 2018-09-20 DIAGNOSIS — Z87.898 HISTORY OF SNORING: ICD-10-CM

## 2018-09-20 PROBLEM — E78.49 OTHER HYPERLIPIDEMIA: Chronic | Status: ACTIVE | Noted: 2018-09-20

## 2018-09-20 LAB
ANION GAP SERPL CALC-SCNC: 10 MMOL/L (ref 0–18)
BASOPHILS # BLD: 0.1 K/UL (ref 0–0.2)
BASOPHILS NFR BLD: 1 %
BUN SERPL-MCNC: 14 MG/DL (ref 8–20)
BUN/CREAT SERPL: 20.9 (ref 10–20)
CALCIUM SERPL-MCNC: 9.4 MG/DL (ref 8.5–10.5)
CHLORIDE SERPL-SCNC: 104 MMOL/L (ref 95–110)
CO2 SERPL-SCNC: 20 MMOL/L (ref 22–32)
CREAT SERPL-MCNC: 0.67 MG/DL (ref 0.5–1.5)
EOSINOPHIL # BLD: 0.1 K/UL (ref 0–0.7)
EOSINOPHIL NFR BLD: 1 %
ERYTHROCYTE [DISTWIDTH] IN BLOOD BY AUTOMATED COUNT: 13.3 % (ref 11–15)
GLUCOSE SERPL-MCNC: 126 MG/DL (ref 70–99)
HCT VFR BLD AUTO: 41.6 % (ref 35–48)
HGB BLD-MCNC: 14.1 G/DL (ref 12–16)
LYMPHOCYTES # BLD: 2.5 K/UL (ref 1–4)
LYMPHOCYTES NFR BLD: 28 %
MCH RBC QN AUTO: 30.9 PG (ref 27–32)
MCHC RBC AUTO-ENTMCNC: 33.8 G/DL (ref 32–37)
MCV RBC AUTO: 91.4 FL (ref 80–100)
MONOCYTES # BLD: 0.7 K/UL (ref 0–1)
MONOCYTES NFR BLD: 8 %
NEUTROPHILS # BLD AUTO: 5.4 K/UL (ref 1.8–7.7)
NEUTROPHILS NFR BLD: 62 %
OSMOLALITY UR CALC.SUM OF ELEC: 280 MOSM/KG (ref 275–295)
PLATELET # BLD AUTO: 469 K/UL (ref 140–400)
PMV BLD AUTO: 8.2 FL (ref 7.4–10.3)
POTASSIUM SERPL-SCNC: 4.4 MMOL/L (ref 3.3–5.1)
RBC # BLD AUTO: 4.55 M/UL (ref 3.7–5.4)
SODIUM SERPL-SCNC: 134 MMOL/L (ref 136–144)
WBC # BLD AUTO: 8.7 K/UL (ref 4–11)

## 2018-09-20 PROCEDURE — 93005 ELECTROCARDIOGRAM TRACING: CPT

## 2018-09-20 PROCEDURE — 99212 OFFICE O/P EST SF 10 MIN: CPT | Performed by: NURSE PRACTITIONER

## 2018-09-20 PROCEDURE — 93010 ELECTROCARDIOGRAM REPORT: CPT | Performed by: NURSE PRACTITIONER

## 2018-09-20 PROCEDURE — 80048 BASIC METABOLIC PNL TOTAL CA: CPT | Performed by: NURSE PRACTITIONER

## 2018-09-20 PROCEDURE — 94618 PULMONARY STRESS TESTING: CPT | Performed by: NURSE PRACTITIONER

## 2018-09-20 PROCEDURE — 85025 COMPLETE CBC W/AUTO DIFF WBC: CPT | Performed by: NURSE PRACTITIONER

## 2018-09-20 PROCEDURE — 99214 OFFICE O/P EST MOD 30 MIN: CPT | Performed by: NURSE PRACTITIONER

## 2018-09-20 PROCEDURE — 36415 COLL VENOUS BLD VENIPUNCTURE: CPT | Performed by: NURSE PRACTITIONER

## 2018-09-20 NOTE — PAYOR COMM NOTE
NORMA CAN YOU PLEASE SEND THIS INFORMATION TO YOUR MEDICAL DIRECTOR FOR RECONSIDERATION FOR INPATIENT SERVICES.     9/9 PULSE OX 89% PLACED ON 4L  UP TO 91% PULSE OX  O2 INCREASED FROM 5 TO 9L NC WITH O2 SAT 92%  PATIENT REMAINED ON O2 NC HIGH FLOW 9L UNITL Progressive worsening of medial right upper lobe airspace disease, favored to represent pneumonia.  Followup is recommended to document resolution.              Dictated by (CST): Wing Valadez MD on 9/09/2018 at 14:30       Approved by (CST): Morena Fontaine CV: Heart with regular rate and rhythm  Abd: Abdomen soft,       HPI:   History of Present Illness: Patient is a 69year old female with PMH sig for HLD, who presents with cough congestion, and palpitations.   Patient states that she has had 4-5 days of cou cefdinir 300 MG Caps  Commonly known as:  OMNICEF  Take 1 capsule (300 mg total) by mouth 2 (two) times daily. DilTIAZem HCl ER Coated Beads 240 MG Cp24  Commonly known as:  CARDIZEM CD  Take 1 capsule (240 mg total) by mouth daily.         CONTINUE sacha DilTIAZem HCl ER Coated Beads 240 MG Cp24  Commonly known as:  CARDIZEM CD  Take 1 capsule (240 mg total) by mouth daily.         CONTINUE taking these medications    CoQ-10 10 MG Caps     Rosuvastatin Calcium 10 MG Tabs  Commonly known as:  CRESTOR [id number]     [id number]            digoxin (LANOXIN) tab 250 mcg   Dose: 250 mcg  Freq: Once Route: OR  Start: 09/09/18 0429 End: 09/09/18 0433    Admin Instructions:   Hold for HR less than 60 and notify physician.   **Hazardous Waste: Dispose of in [place] do not initiate oral therapy until 6-8 hours after the last IV acetaminophen dose if IV acetaminophen was used previously         [id number]            apixaban (ELIQUIS) tab 5 mg   Dose: 5 mg  Freq: 2 times daily Route: OR  Start: 09/09/18 1330 End: 09/13/18 If pause is greater than 3.0 seconds, decrease drip rate to 2.5 milligram per hour  If pause is greater than 4.0 seconds, hold drip and call physician                   diltiazem 100mg/100ml in NaCl (CARDIZEM) premix/add-vantage   Rate: 2.5-25 mL/hr Dose: DilTIAZem HCl ER Coated Beads (CARDIZEM CD) 24 hr cap 240 mg   Dose: 240 mg  Freq: Daily Route: OR  Start: 09/12/18 1200 End: 09/13/18 1549    Admin Instructions:   Do not crush            [id number]      [id number]        heparin (PORCINE) drip 22855mfeuu/250mL i potassium chloride (K-SOL) 40 meq/30 ml (10%) oral solution 40 mEq   Dose: 40 mEq  Freq: Every 4 hours Route: OR  Start: 09/09/18 1245 End: 09/09/18 2044            [id number]            Potassium Chloride ER (K-DUR M20) CR tab 40 mEq   Dose: 40 mEq  Freq: Priyanka Ext - no edema  Skin - no rashes  Mental status - interactive, answering questions appropriately     Medications:  Reviewed in EMR     Lab Data:  Reviewed in EMR     Imaging:  Chest imaging reviewed

## 2019-07-23 PROBLEM — I70.0 AORTIC ATHEROSCLEROSIS (HCC): Status: ACTIVE | Noted: 2019-07-23

## 2019-07-23 PROBLEM — I70.0 AORTIC ATHEROSCLEROSIS: Status: ACTIVE | Noted: 2019-07-23

## 2020-10-20 PROBLEM — M79.671 ACUTE FOOT PAIN, RIGHT: Status: ACTIVE | Noted: 2020-10-20

## 2020-10-26 ENCOUNTER — HOSPITAL ENCOUNTER (OUTPATIENT)
Dept: CT IMAGING | Facility: HOSPITAL | Age: 70
Discharge: HOME | End: 2020-10-26
Attending: ORTHOPAEDIC SURGERY
Payer: MEDICARE

## 2020-10-26 DIAGNOSIS — M79.671 ACUTE FOOT PAIN, RIGHT: ICD-10-CM

## 2020-10-26 PROCEDURE — 76376 3D RENDER W/INTRP POSTPROCES: CPT | Performed by: ORTHOPAEDIC SURGERY

## 2020-10-26 PROCEDURE — 73700 CT LOWER EXTREMITY W/O DYE: CPT | Performed by: ORTHOPAEDIC SURGERY

## 2020-10-27 PROBLEM — S90.121A CONTUSION OF FIFTH TOE OF RIGHT FOOT: Status: ACTIVE | Noted: 2020-10-27

## 2020-11-23 PROBLEM — S90.121D: Status: ACTIVE | Noted: 2020-11-23

## 2020-11-23 PROBLEM — S90.31XD: Status: ACTIVE | Noted: 2020-11-23

## 2021-08-31 ENCOUNTER — OFFICE VISIT (OUTPATIENT)
Dept: DERMATOLOGY CLINIC | Facility: CLINIC | Age: 71
End: 2021-08-31
Payer: COMMERCIAL

## 2021-08-31 DIAGNOSIS — L82.0 INFLAMED SEBORRHEIC KERATOSIS: ICD-10-CM

## 2021-08-31 DIAGNOSIS — L81.4 SOLAR LENTIGO: ICD-10-CM

## 2021-08-31 DIAGNOSIS — D22.9 MULTIPLE MELANOCYTIC NEVI: ICD-10-CM

## 2021-08-31 DIAGNOSIS — L57.8 SUN-DAMAGED SKIN: ICD-10-CM

## 2021-08-31 DIAGNOSIS — D48.5 NEOPLASM OF UNCERTAIN BEHAVIOR OF SKIN: Primary | ICD-10-CM

## 2021-08-31 DIAGNOSIS — L82.1 SEBORRHEIC KERATOSES: ICD-10-CM

## 2021-08-31 DIAGNOSIS — Z87.2 HISTORY OF ACTINIC KERATOSES: ICD-10-CM

## 2021-08-31 PROCEDURE — 99203 OFFICE O/P NEW LOW 30 MIN: CPT | Performed by: DERMATOLOGY

## 2021-08-31 PROCEDURE — 17110 DESTRUCTION B9 LES UP TO 14: CPT | Performed by: DERMATOLOGY

## 2021-08-31 PROCEDURE — 11102 TANGNTL BX SKIN SINGLE LES: CPT | Performed by: DERMATOLOGY

## 2021-08-31 NOTE — PROGRESS NOTES
.  HPI:     Chief Complaint     Lesion        HPI     Lesion      Additional comments: New pt. pt presenting today with lesions of concern. pt requesting full body skin check. pt denies family and personal HX of skin cancer.           Last edited by Salvador Degroot Spouse name: Not on file      Number of children: Not on file      Years of education: Not on file      Highest education level: Not on file    Occupational History      Not on file    Tobacco Use      Smoking status: Former Smoker      Smokeless tobacco: patient is alert, oriented, and appears their stated age. Patient is well nourished and in no distress    The exam was remarkable for the following:  Raliegh Blaze is a rip in the right earlobe.   Medial aspect of it is remarkable for a 2.5 mm slightly pearly red discussed    No orders of the defined types were placed in this encounter. Results From Past 48 Hours:  No results found for this or any previous visit (from the past 48 hour(s)).     Meds This Visit:      Imaging Orders:  None     Referral Orders:  No

## 2021-10-29 ENCOUNTER — APPOINTMENT (OUTPATIENT)
Dept: GENERAL RADIOLOGY | Facility: HOSPITAL | Age: 71
End: 2021-10-29
Attending: EMERGENCY MEDICINE
Payer: MEDICARE

## 2021-10-29 ENCOUNTER — HOSPITAL ENCOUNTER (EMERGENCY)
Facility: HOSPITAL | Age: 71
Discharge: HOME | End: 2021-10-29
Attending: EMERGENCY MEDICINE
Payer: MEDICARE

## 2021-10-29 VITALS
TEMPERATURE: 97 F | WEIGHT: 120 LBS | HEIGHT: 58 IN | DIASTOLIC BLOOD PRESSURE: 89 MMHG | HEART RATE: 68 BPM | OXYGEN SATURATION: 96 % | BODY MASS INDEX: 25.19 KG/M2 | RESPIRATION RATE: 18 BRPM | SYSTOLIC BLOOD PRESSURE: 139 MMHG

## 2021-10-29 DIAGNOSIS — J01.90 ACUTE SINUSITIS, RECURRENCE NOT SPECIFIED, UNSPECIFIED LOCATION: Primary | ICD-10-CM

## 2021-10-29 PROCEDURE — 93010 ELECTROCARDIOGRAM REPORT: CPT | Performed by: EMERGENCY MEDICINE

## 2021-10-29 PROCEDURE — 99284 EMERGENCY DEPT VISIT MOD MDM: CPT

## 2021-10-29 PROCEDURE — 93005 ELECTROCARDIOGRAM TRACING: CPT

## 2021-10-29 PROCEDURE — 71045 X-RAY EXAM CHEST 1 VIEW: CPT | Performed by: EMERGENCY MEDICINE

## 2021-10-29 RX ORDER — AMOXICILLIN 500 MG/1
500 TABLET, FILM COATED ORAL 3 TIMES DAILY
Qty: 30 TABLET | Refills: 0 | Status: SHIPPED | OUTPATIENT
Start: 2021-10-29 | End: 2021-11-08

## 2021-10-29 RX ORDER — PSEUDOEPHEDRINE HYDROCHLORIDE 30 MG/1
30 TABLET ORAL EVERY 4 HOURS PRN
Qty: 36 TABLET | Refills: 0 | Status: SHIPPED | OUTPATIENT
Start: 2021-10-29 | End: 2021-11-28

## 2021-10-29 NOTE — ED PROVIDER NOTES
Patient Seen in: [facility] AND [facility] Emergency Department      History   Patient presents with:  Sinus Problem  Chest Pressure    Stated Complaint: sinus pressure     Subjective:   HPI    64-year-old female with history of hyperlipidemia and prior atrial f tenderness noted.   Respiratory: there are no retractions, lungs are clear to auscultation  Cardiovascular: regular rate and rhythm  Gastrointestinal:  abdomen is soft and non tender, no masses, bowel sounds normal  Neurological: Speech normal.  Moving extr

## 2022-01-29 ENCOUNTER — APPOINTMENT (OUTPATIENT)
Dept: GENERAL RADIOLOGY | Age: 72
End: 2022-01-29
Attending: EMERGENCY MEDICINE
Payer: MEDICARE

## 2022-01-29 ENCOUNTER — HOSPITAL ENCOUNTER (OUTPATIENT)
Age: 72
Discharge: HOME | End: 2022-01-29
Attending: EMERGENCY MEDICINE
Payer: MEDICARE

## 2022-01-29 VITALS
HEART RATE: 100 BPM | DIASTOLIC BLOOD PRESSURE: 88 MMHG | TEMPERATURE: 98 F | SYSTOLIC BLOOD PRESSURE: 157 MMHG | OXYGEN SATURATION: 96 % | RESPIRATION RATE: 18 BRPM

## 2022-01-29 DIAGNOSIS — U07.1 COVID-19: Primary | ICD-10-CM

## 2022-01-29 DIAGNOSIS — J02.0 STREPTOCOCCAL SORE THROAT: ICD-10-CM

## 2022-01-29 LAB
S PYO AG THROAT QL: POSITIVE
SARS-COV-2 RNA RESP QL NAA+PROBE: DETECTED

## 2022-01-29 PROCEDURE — 87880 STREP A ASSAY W/OPTIC: CPT

## 2022-01-29 PROCEDURE — 71046 X-RAY EXAM CHEST 2 VIEWS: CPT | Performed by: EMERGENCY MEDICINE

## 2022-01-29 PROCEDURE — 99214 OFFICE O/P EST MOD 30 MIN: CPT

## 2022-01-29 PROCEDURE — 99213 OFFICE O/P EST LOW 20 MIN: CPT

## 2022-01-29 RX ORDER — FLUTICASONE PROPIONATE 50 MCG
2 SPRAY, SUSPENSION (ML) NASAL DAILY
Qty: 16 G | Refills: 0 | Status: SHIPPED | OUTPATIENT
Start: 2022-01-29 | End: 2022-02-28

## 2022-01-29 RX ORDER — AMOXICILLIN 500 MG/1
500 TABLET, FILM COATED ORAL 2 TIMES DAILY
Qty: 20 TABLET | Refills: 0 | Status: SHIPPED | OUTPATIENT
Start: 2022-01-29 | End: 2022-02-08

## 2022-01-29 NOTE — ED INITIAL ASSESSMENT (HPI)
Patient reports bringing her son in earlier this week and he had tested positive for covid and strep. Patient reports having a sore throat, cough, and sinus congestion that started Wednesday night. Patient has her booster against covid.

## 2022-02-15 ENCOUNTER — HOSPITAL ENCOUNTER (OUTPATIENT)
Age: 72
Discharge: HOME | End: 2022-02-15
Attending: EMERGENCY MEDICINE
Payer: MEDICARE

## 2022-02-15 VITALS
RESPIRATION RATE: 18 BRPM | HEART RATE: 88 BPM | TEMPERATURE: 98 F | DIASTOLIC BLOOD PRESSURE: 76 MMHG | OXYGEN SATURATION: 100 % | SYSTOLIC BLOOD PRESSURE: 176 MMHG

## 2022-02-15 DIAGNOSIS — I10 HYPERTENSION, UNSPECIFIED TYPE: ICD-10-CM

## 2022-02-15 DIAGNOSIS — J06.9 VIRAL URI: Primary | ICD-10-CM

## 2022-02-15 LAB — S PYO AG THROAT QL: NEGATIVE

## 2022-02-15 PROCEDURE — 87880 STREP A ASSAY W/OPTIC: CPT

## 2022-02-15 PROCEDURE — 99212 OFFICE O/P EST SF 10 MIN: CPT

## 2022-02-16 ENCOUNTER — OFFICE VISIT (OUTPATIENT)
Dept: DERMATOLOGY CLINIC | Facility: CLINIC | Age: 72
End: 2022-02-16
Payer: COMMERCIAL

## 2022-02-16 DIAGNOSIS — L81.4 SOLAR LENTIGO: ICD-10-CM

## 2022-02-16 DIAGNOSIS — L82.1 SEBORRHEIC KERATOSES: ICD-10-CM

## 2022-02-16 DIAGNOSIS — D22.9 MULTIPLE NEVI: ICD-10-CM

## 2022-02-16 DIAGNOSIS — D23.9 BENIGN NEOPLASM OF SKIN, UNSPECIFIED LOCATION: ICD-10-CM

## 2022-02-16 DIAGNOSIS — L57.0 AK (ACTINIC KERATOSIS): Primary | ICD-10-CM

## 2022-02-16 PROCEDURE — 17000 DESTRUCT PREMALG LESION: CPT | Performed by: DERMATOLOGY

## 2022-02-16 PROCEDURE — 17003 DESTRUCT PREMALG LES 2-14: CPT | Performed by: DERMATOLOGY

## 2022-02-16 PROCEDURE — 99213 OFFICE O/P EST LOW 20 MIN: CPT | Performed by: DERMATOLOGY

## 2022-02-17 ENCOUNTER — TELEPHONE (OUTPATIENT)
Dept: DERMATOLOGY CLINIC | Facility: CLINIC | Age: 72
End: 2022-02-17

## 2022-02-17 NOTE — TELEPHONE ENCOUNTER
LOV 2/16/22 - Pt states she feels like she has a scratched cornea and thinks her eye might have gotten hit with the cryotherapy. Pt reassured that gauze is used to protect the eye when cryotherapy is applied near the eye area. Pt asking if it is still possible that the eye might have gotten sprayed a little? Pt denies any swelling, draining, or crusting in the eye but states she does have slight redness in the white of her eye. Pt states she tried eye drops but they did not help. Please advise. Thank you.

## 2022-02-17 NOTE — TELEPHONE ENCOUNTER
I did not hit her eye with cryo- and there were no lesions we would consider close like in the orbital or medial canthus area. I would have needed to directly spray into her eye, which did not happen. Is it possible that she was concerned and rubbed the eye or lint from the gauze maybe irritated the area?      At any rate--saline eye drops and observe can check if she wants   ( FYI She did keep jumping with the cryo)

## 2022-09-02 ENCOUNTER — HOSPITAL ENCOUNTER (OUTPATIENT)
Age: 72
Discharge: HOME | End: 2022-09-02
Attending: EMERGENCY MEDICINE
Payer: MEDICARE

## 2022-09-02 VITALS
TEMPERATURE: 98 F | RESPIRATION RATE: 18 BRPM | HEART RATE: 82 BPM | OXYGEN SATURATION: 100 % | DIASTOLIC BLOOD PRESSURE: 73 MMHG | SYSTOLIC BLOOD PRESSURE: 135 MMHG

## 2022-09-02 DIAGNOSIS — J02.0 STREP PHARYNGITIS: Primary | ICD-10-CM

## 2022-09-02 LAB
S PYO AG THROAT QL: POSITIVE
SARS-COV-2 RNA RESP QL NAA+PROBE: NOT DETECTED

## 2022-09-02 PROCEDURE — 87880 STREP A ASSAY W/OPTIC: CPT

## 2022-09-02 PROCEDURE — 99213 OFFICE O/P EST LOW 20 MIN: CPT

## 2022-09-02 PROCEDURE — 99214 OFFICE O/P EST MOD 30 MIN: CPT

## 2022-09-02 RX ORDER — AZITHROMYCIN 250 MG/1
TABLET, FILM COATED ORAL
Qty: 6 TABLET | Refills: 0 | Status: SHIPPED | OUTPATIENT
Start: 2022-09-02 | End: 2022-09-07

## 2022-09-02 NOTE — ED INITIAL ASSESSMENT (HPI)
PATIENT ARRIVED AMBULATORY TO ROOM C/O SYMPTOMS THAT STARTED 3 DAYS AGO. +SCRATCHY THROAT. SUBJECTIVE FEVERS. EASY NON LABORED RESPIRATIONS.  NO DISTRESS

## 2022-10-10 ENCOUNTER — OFFICE VISIT (OUTPATIENT)
Dept: DERMATOLOGY CLINIC | Facility: CLINIC | Age: 72
End: 2022-10-10
Payer: COMMERCIAL

## 2022-10-10 DIAGNOSIS — D22.9 MULTIPLE MELANOCYTIC NEVI: ICD-10-CM

## 2022-10-10 DIAGNOSIS — L82.1 SEBORRHEIC KERATOSES: Primary | ICD-10-CM

## 2022-10-10 DIAGNOSIS — Z87.2 HISTORY OF ACTINIC KERATOSES: ICD-10-CM

## 2022-10-10 DIAGNOSIS — L81.4 SOLAR LENTIGO: ICD-10-CM

## 2022-10-10 DIAGNOSIS — D22.9 MULTIPLE NEVI: ICD-10-CM

## 2022-10-10 DIAGNOSIS — D23.9 BENIGN NEOPLASM OF SKIN, UNSPECIFIED LOCATION: ICD-10-CM

## 2022-10-10 DIAGNOSIS — L82.0 INFLAMED SEBORRHEIC KERATOSIS: ICD-10-CM

## 2022-10-10 PROCEDURE — 1126F AMNT PAIN NOTED NONE PRSNT: CPT | Performed by: DERMATOLOGY

## 2022-10-10 PROCEDURE — 99213 OFFICE O/P EST LOW 20 MIN: CPT | Performed by: DERMATOLOGY

## 2022-10-10 RX ORDER — FLUTICASONE PROPIONATE 50 MCG
SPRAY, SUSPENSION (ML) NASAL
COMMUNITY

## 2022-10-10 RX ORDER — PSEUDOEPHEDRINE HYDROCHLORIDE 30 MG/1
TABLET ORAL
COMMUNITY

## 2023-04-10 ENCOUNTER — OFFICE VISIT (OUTPATIENT)
Dept: DERMATOLOGY CLINIC | Facility: CLINIC | Age: 73
End: 2023-04-10

## 2023-04-10 DIAGNOSIS — L81.4 SOLAR LENTIGO: ICD-10-CM

## 2023-04-10 DIAGNOSIS — D23.9 BENIGN NEOPLASM OF SKIN, UNSPECIFIED LOCATION: ICD-10-CM

## 2023-04-10 DIAGNOSIS — Z87.2 HISTORY OF ACTINIC KERATOSES: ICD-10-CM

## 2023-04-10 DIAGNOSIS — L82.1 SEBORRHEIC KERATOSES: ICD-10-CM

## 2023-04-10 DIAGNOSIS — D22.9 MULTIPLE NEVI: ICD-10-CM

## 2023-04-10 DIAGNOSIS — L82.0 INFLAMED SEBORRHEIC KERATOSIS: Primary | ICD-10-CM

## 2023-04-10 PROCEDURE — 1126F AMNT PAIN NOTED NONE PRSNT: CPT | Performed by: DERMATOLOGY

## 2023-04-10 PROCEDURE — 99213 OFFICE O/P EST LOW 20 MIN: CPT | Performed by: DERMATOLOGY

## 2023-04-10 RX ORDER — COVID-19 ANTIGEN TEST
KIT MISCELLANEOUS
COMMUNITY
Start: 2023-03-25

## 2023-08-22 ENCOUNTER — HOSPITAL ENCOUNTER (OUTPATIENT)
Age: 73
Discharge: HOME | End: 2023-08-22
Attending: EMERGENCY MEDICINE
Payer: MEDICARE

## 2023-08-22 VITALS
OXYGEN SATURATION: 98 % | RESPIRATION RATE: 18 BRPM | HEART RATE: 96 BPM | DIASTOLIC BLOOD PRESSURE: 70 MMHG | TEMPERATURE: 99 F | SYSTOLIC BLOOD PRESSURE: 132 MMHG

## 2023-08-22 DIAGNOSIS — U07.1 COVID-19: Primary | ICD-10-CM

## 2023-08-22 LAB
S PYO AG THROAT QL IA.RAPID: NEGATIVE
SARS-COV-2 RNA RESP QL NAA+PROBE: DETECTED

## 2023-08-22 PROCEDURE — 99213 OFFICE O/P EST LOW 20 MIN: CPT

## 2023-08-22 PROCEDURE — 87651 STREP A DNA AMP PROBE: CPT | Performed by: EMERGENCY MEDICINE

## 2023-08-22 RX ORDER — BENZONATATE 100 MG/1
100 CAPSULE ORAL 3 TIMES DAILY PRN
Qty: 30 CAPSULE | Refills: 0 | Status: SHIPPED | OUTPATIENT
Start: 2023-08-22 | End: 2023-09-21

## 2023-08-22 NOTE — DISCHARGE INSTRUCTIONS
Hold your rosuvastatin while taking the Paxlovid should you choose to take it. If you do choose to take Paxlovid, you should either take your diltiazem every other day or take a half a dose a day while on the medication.

## 2023-09-02 ENCOUNTER — OFFICE VISIT (OUTPATIENT)
Dept: FAMILY MEDICINE CLINIC | Facility: CLINIC | Age: 73
End: 2023-09-02
Payer: COMMERCIAL

## 2023-09-02 VITALS
OXYGEN SATURATION: 96 % | WEIGHT: 127 LBS | RESPIRATION RATE: 16 BRPM | HEIGHT: 59 IN | BODY MASS INDEX: 25.6 KG/M2 | TEMPERATURE: 97 F | DIASTOLIC BLOOD PRESSURE: 77 MMHG | SYSTOLIC BLOOD PRESSURE: 138 MMHG | HEART RATE: 85 BPM

## 2023-09-02 DIAGNOSIS — J02.9 PHARYNGITIS, UNSPECIFIED ETIOLOGY: ICD-10-CM

## 2023-09-02 DIAGNOSIS — J02.9 SORE THROAT: Primary | ICD-10-CM

## 2023-09-02 LAB
CONTROL LINE PRESENT WITH A CLEAR BACKGROUND (YES/NO): YES YES/NO
KIT LOT #: NORMAL NUMERIC
STREP GRP A CUL-SCR: NEGATIVE

## 2023-10-23 ENCOUNTER — OFFICE VISIT (OUTPATIENT)
Dept: DERMATOLOGY CLINIC | Facility: CLINIC | Age: 73
End: 2023-10-23

## 2023-10-23 DIAGNOSIS — L57.0 AK (ACTINIC KERATOSIS): Primary | ICD-10-CM

## 2023-10-23 DIAGNOSIS — L82.0 INFLAMED SEBORRHEIC KERATOSIS: ICD-10-CM

## 2023-10-23 DIAGNOSIS — D22.9 MULTIPLE MELANOCYTIC NEVI: ICD-10-CM

## 2023-10-23 DIAGNOSIS — L81.4 SOLAR LENTIGO: ICD-10-CM

## 2023-10-23 DIAGNOSIS — D22.9 MULTIPLE NEVI: ICD-10-CM

## 2023-10-23 DIAGNOSIS — D23.9 BENIGN NEOPLASM OF SKIN, UNSPECIFIED LOCATION: ICD-10-CM

## 2023-10-23 DIAGNOSIS — L82.1 SEBORRHEIC KERATOSES: ICD-10-CM

## 2023-10-23 PROCEDURE — 1160F RVW MEDS BY RX/DR IN RCRD: CPT | Performed by: DERMATOLOGY

## 2023-10-23 PROCEDURE — 1159F MED LIST DOCD IN RCRD: CPT | Performed by: DERMATOLOGY

## 2023-10-23 PROCEDURE — 17000 DESTRUCT PREMALG LESION: CPT | Performed by: DERMATOLOGY

## 2023-10-23 PROCEDURE — 1126F AMNT PAIN NOTED NONE PRSNT: CPT | Performed by: DERMATOLOGY

## 2023-10-23 PROCEDURE — 99213 OFFICE O/P EST LOW 20 MIN: CPT | Performed by: DERMATOLOGY

## 2023-10-23 PROCEDURE — 17003 DESTRUCT PREMALG LES 2-14: CPT | Performed by: DERMATOLOGY

## 2023-11-05 NOTE — PROGRESS NOTES
Raul Simon is a 74year old female. HPI:     CC:  Patient presents with:  Full Skin Exam: LOV 04/23. Hx of Aks. Pt present for full body exam. Pt denies any areas of concern. Allergies:  Patient has no known allergies. HISTORY:    Past Medical History:   Diagnosis Date    Arrhythmia     Gilbert disease     Hyperlipidemia       Past Surgical History:   Procedure Laterality Date    ORAL SURGERY      TONSILLECTOMY        Family History   Problem Relation Age of Onset    Ulcerative Colitis Mother     Other (Renal cell cancer) Father     Other (cad) Brother       Social History     Socioeconomic History    Marital status:    Tobacco Use    Smoking status: Former     Types: Cigarettes    Smokeless tobacco: Never    Tobacco comments:     as a teen   Vaping Use    Vaping Use: Never used   Substance and Sexual Activity    Alcohol use: Yes     Comment: occasionally    Drug use: No   Other Topics Concern    Reaction to local anesthetic No    Pt has a pacemaker No    Pt has a defibrillator No        Current Outpatient Medications   Medication Sig Dispense Refill    Ascorbic Acid (VITAMIN C ER OR) Vitamin C      ZINC OR zinc      dilTIAZem HCl ER Coated Beads 240 MG Oral Capsule SR 24 Hr Take 1 capsule (240 mg total) by mouth daily. 90 capsule 3    Rosuvastatin Calcium 10 MG Oral Tab Take 1 tablet (10 mg total) by mouth nightly. 90 tablet 2    Coenzyme Q10 (COQ-10) 10 MG Oral Cap Take 1 capsule by mouth. FLOWFLEX COVID-19 AG HOME TEST In Vitro Kit FOR PERSONAL USE ONLY. NOT FOR EMPLOYMENT PURPOSES OR FOR RESALE. (Patient not taking: Reported on 4/10/2023)      fluticasone propionate 50 MCG/ACT Nasal Suspension fluticasone propionate 50 mcg/actuation nasal spray,suspension   2 sprays by Nasal route daily.  (Patient not taking: Reported on 10/10/2022)      pseudoephedrine 30 MG Oral Tab Nasal Decongestant (pseudoephedrine) 30 mg tablet   TAKE ONE TABLET BY MOUTH EVERY FOUR HOURS AS NEEDED FOR CONGESTION (Patient not taking: Reported on 10/10/2022)      mupirocin 2 % External Ointment  (Patient not taking: Reported on 10/10/2022)       Allergies:   No Known Allergies    Past Medical History:   Diagnosis Date    Arrhythmia     Gilbert disease     Hyperlipidemia      Past Surgical History:   Procedure Laterality Date    ORAL SURGERY      TONSILLECTOMY       Social History    Socioeconomic History      Marital status:       Spouse name: Not on file      Number of children: Not on file      Years of education: Not on file      Highest education level: Not on file    Occupational History      Not on file    Tobacco Use      Smoking status: Former        Types: Cigarettes      Smokeless tobacco: Never      Tobacco comments: as a teen    Vaping Use      Vaping Use: Never used    Substance and Sexual Activity      Alcohol use: Yes        Comment: occasionally      Drug use: No      Sexual activity: Not on file    Other Topics      Concerns:        Grew up on a farm: Not Asked        History of tanning: Not Asked        Outdoor occupation: Not Asked        Breast feeding: Not Asked        Reaction to local anesthetic: No        Pt has a pacemaker: No        Pt has a defibrillator: No    Social History Narrative      Not on file    Social Determinants of Health  Financial Resource Strain: Not on file  Food Insecurity: Not on file  Transportation Needs: Not on file  Physical Activity: Not on file  Stress: Not on file  Social Connections: Not on file  Housing Stability: Not on file  Family History   Problem Relation Age of Onset    Ulcerative Colitis Mother     Other (Renal cell cancer) Father     Other (cad) Brother        There were no vitals filed for this visit. HPI:  Patient presents with:  Full Skin Exam: LOV 04/23. Hx of Aks. Pt present for full body exam. Pt denies any areas of concern.      Patient with recent episode of COVID again has been slowly recovering    Follow-up history of actinic keratoses. Has been careful sun protection. Patient has any particular lesions of concern. had seen Dr. Gordo Betancourt previously. Many lesions frozen with past, history of skin cancer    Patient presents with concerns above. Patient has been in their usual state of health. Past notes/ records and appropriate/relevant lab results including pathology and past body maps reviewed. Including outside notes/ PCP notes as appropriate. Updated and new information noted in current visit. ROS:  Denies other relevant systemic complaints. History, medications, allergies reviewed as noted. Physical Examination:     Well-developed well-nourished patient alert oriented in no acute distress. Exam performed, including scalp, head, neck, face,nails, hair, external eyes, including conjunctival mucosa, eyelids, lips external ears , arms, digits,palms. Multiple light to medium brown, well marginated, uniformly pigmented, macules and papules 6 mm and less scattered on exam. pigmented lesions examined with dermoscopy benign-appearing patterns. Waxy tannish keratotic papules scattered, cherry-red vascular papules scattered. See map today's date for lesions noted . See assessment and plan below for specific lesions. Otherwise remarkable for lesions as noted on map. See A/P  below for additional information:    Assessment / plan:    No orders of the defined types were placed in this encounter. Meds & Refills for this Visit:  Requested Prescriptions      No prescriptions requested or ordered in this encounter         Ak (actinic keratosis)  (primary encounter diagnosis)  Multiple melanocytic nevi  Multiple nevi  Seborrheic keratoses  Inflamed seborrheic keratosis  Solar lentigo  Benign neoplasm of skin, unspecified location    Erythematous scaling keratotic papules noted at sites noted on map  Actinic Keratoses. Precancerous nature discussed.  Sun protection, sunscreen/ blocks encouraged Lesions treated with cryo- . Biopsy if not resolved. Bilateral brows, left shoulderx3    Numerous SKs irritated over the temples chest upper back  Other waxy tan papules, lentigines over the back shoulders, more inflamed lesion at left shoulder  Reassurance consider trial of cryo    Scattered other benign keratoses lentigines   Waxy tan keratotic papules lesions in areas of concern as noted reassurance given. Benign nature discussed. Possibility of cryo, alphahydroxy acids over-the-counter retinol's discussed. Lentigines, sun damage continue sun protection regular skin checks      Split earlobe follow-up with Dr. Mariah Quinones for repair right earlobe  No other susupicious lesions on todays  exam.    Follow-up for full skin exam 6 months    Please refer to map for specific lesions. See additional diagnoses. Pros cons of various therapies, risks benefits discussed. Pathophysiology discussed with patient. Therapeutic options reviewed. See  Medications in grid. Instructions reviewed at length. Benign nevi, seborrheic  keratoses, cherry angiomas:  Reassurance regarding other benign skin lesions. Signs and symptoms of skin cancer, ABCDE's of melanoma discussed with patient. Sunscreen use, sun protection, self exams reviewed. Followup as noted RTC ---routine checkup    6 mos -one year or p.r.n. Encounter Times   Including precharting, reviewing chart, prior notes obtaining history: 10 minutes, medical exam :10 minutes, notes on body map, plan, counseling right cataract  Reassurance regarding benign minutes My total time spent caring for the patient on the day of the encounter: 30 minutes     The patient indicates understanding of these issues and agrees to the plan. The patient is asked to return as noted in follow-up/ above. This note was generated using Dragon voice recognition software. Please contact me regarding any confusion resulting from errors in recognition. .   Note to patient and family: The 21st Century Cures Act makes medical notes like these available to patients. However, be advised this is a medical document. It is intended as woij-ky-ayzw communication and monitoring of a patient's care needs. It is written in medical language and may contain abbreviations or verbiage that are unfamiliar. It may appear blunt or direct. Medical documents are intended to carry relevant information, facts as evident and the clinical opinion of the practitioner.

## 2024-03-17 ENCOUNTER — OFFICE VISIT (OUTPATIENT)
Dept: FAMILY MEDICINE CLINIC | Facility: CLINIC | Age: 74
End: 2024-03-17
Payer: COMMERCIAL

## 2024-03-17 VITALS
HEART RATE: 77 BPM | DIASTOLIC BLOOD PRESSURE: 56 MMHG | HEIGHT: 59 IN | RESPIRATION RATE: 18 BRPM | WEIGHT: 132 LBS | BODY MASS INDEX: 26.61 KG/M2 | OXYGEN SATURATION: 97 % | TEMPERATURE: 98 F | SYSTOLIC BLOOD PRESSURE: 126 MMHG

## 2024-03-17 DIAGNOSIS — J01.90 ACUTE SINUSITIS WITH SYMPTOMS GREATER THAN 10 DAYS: Primary | ICD-10-CM

## 2024-03-17 DIAGNOSIS — H61.23 BILATERAL IMPACTED CERUMEN: ICD-10-CM

## 2024-03-17 PROCEDURE — 3074F SYST BP LT 130 MM HG: CPT

## 2024-03-17 PROCEDURE — 99213 OFFICE O/P EST LOW 20 MIN: CPT

## 2024-03-17 PROCEDURE — 3078F DIAST BP <80 MM HG: CPT

## 2024-03-17 PROCEDURE — 3008F BODY MASS INDEX DOCD: CPT

## 2024-03-17 PROCEDURE — 1159F MED LIST DOCD IN RCRD: CPT

## 2024-03-17 PROCEDURE — 1160F RVW MEDS BY RX/DR IN RCRD: CPT

## 2024-03-17 RX ORDER — FLUTICASONE FUROATE 27.5 UG/1
2 SPRAY, METERED NASAL DAILY
Qty: 9.1 ML | Refills: 0 | Status: SHIPPED | OUTPATIENT
Start: 2024-03-17 | End: 2024-03-24

## 2024-03-17 RX ORDER — AMOXICILLIN AND CLAVULANATE POTASSIUM 875; 125 MG/1; MG/1
1 TABLET, FILM COATED ORAL 2 TIMES DAILY
Qty: 14 TABLET | Refills: 0 | Status: SHIPPED | OUTPATIENT
Start: 2024-03-17 | End: 2024-03-24

## 2024-03-17 NOTE — PROGRESS NOTES
CHIEF COMPLAINT:     Chief Complaint   Patient presents with    Sinus Problem       HPI:   Tonya Mcclellan is a 73 year old female who presents for cold symptoms for  2  weeks. Symptoms have progressed into sinus congestion and been worsening since onset. Sinus congestion/pain is described as a pressure and is located mainly frontal and maxillary sinuses.  Patient also reports post nasal drip, raspy voice, and slight cough. Patient denies fever or dental pain. Has treated symptoms with OTC medications.       Current Outpatient Medications   Medication Sig Dispense Refill    amoxicillin clavulanate 875-125 MG Oral Tab Take 1 tablet by mouth 2 (two) times daily for 7 days. 14 tablet 0    Fluticasone Furoate (FLONASE SENSIMIST) 27.5 MCG/SPRAY Nasal Suspension 2 sprays by Nasal route daily for 7 days. 9.1 mL 0    Ascorbic Acid (VITAMIN C ER OR) Vitamin C      ZINC OR zinc      dilTIAZem HCl ER Coated Beads 240 MG Oral Capsule SR 24 Hr Take 1 capsule (240 mg total) by mouth daily. 90 capsule 3    Rosuvastatin Calcium 10 MG Oral Tab Take 1 tablet (10 mg total) by mouth nightly. 90 tablet 2    Coenzyme Q10 (COQ-10) 10 MG Oral Cap Take 1 capsule by mouth.      FLOWFLEX COVID-19 AG HOME TEST In Vitro Kit FOR PERSONAL USE ONLY. NOT FOR EMPLOYMENT PURPOSES OR FOR RESALE. (Patient not taking: Reported on 4/10/2023)      fluticasone propionate 50 MCG/ACT Nasal Suspension fluticasone propionate 50 mcg/actuation nasal spray,suspension   2 sprays by Nasal route daily. (Patient not taking: Reported on 10/10/2022)      pseudoephedrine 30 MG Oral Tab Nasal Decongestant (pseudoephedrine) 30 mg tablet   TAKE ONE TABLET BY MOUTH EVERY FOUR HOURS AS NEEDED FOR CONGESTION (Patient not taking: Reported on 10/10/2022)      mupirocin 2 % External Ointment  (Patient not taking: Reported on 10/10/2022)        Past Medical History:   Diagnosis Date    Arrhythmia     Gilbert disease     Hyperlipidemia       Past Surgical History:   Procedure  Laterality Date    ORAL SURGERY      TONSILLECTOMY        Family History   Problem Relation Age of Onset    Ulcerative Colitis Mother     Other (Renal cell cancer) Father     Other (cad) Brother       Social History     Socioeconomic History    Marital status:    Tobacco Use    Smoking status: Former     Types: Cigarettes    Smokeless tobacco: Never    Tobacco comments:     as a teen   Vaping Use    Vaping Use: Never used   Substance and Sexual Activity    Alcohol use: Yes     Comment: occasionally    Drug use: No   Other Topics Concern    Reaction to local anesthetic No    Pt has a pacemaker No    Pt has a defibrillator No         REVIEW OF SYSTEMS:   GENERAL:  denies  diminished appetite  SKIN: no rashes or abnormal skin lesions  HEENT: See HPI.    LUNGS: denies shortness of breath or wheezing, See HPI  CARDIOVASCULAR: denies chest pain or palpitations   GI: denies N/V/C or abdominal pain  NEURO: + sinus headaches.  No numbness or tingling in face.    EXAM:   /56   Pulse 77   Temp 97.5 °F (36.4 °C)   Resp 18   Ht 4' 11\" (1.499 m)   Wt 132 lb (59.9 kg)   SpO2 97%   BMI 26.66 kg/m²   Physical Exam  Vitals reviewed.   Constitutional:       General: She is not in acute distress.     Appearance: Normal appearance. She is not ill-appearing or toxic-appearing.   HENT:      Head: Normocephalic and atraumatic.      Right Ear: Hearing and external ear normal. There is impacted cerumen.      Left Ear: Hearing and external ear normal. There is impacted cerumen.      Nose:      Right Sinus: Maxillary sinus tenderness and frontal sinus tenderness present.      Left Sinus: Maxillary sinus tenderness and frontal sinus tenderness present.      Mouth/Throat:      Mouth: Mucous membranes are moist.      Pharynx: Oropharynx is clear. Uvula midline. No pharyngeal swelling, oropharyngeal exudate, posterior oropharyngeal erythema or uvula swelling.      Comments: Tonsils surgically absent  Eyes:       Conjunctiva/sclera: Conjunctivae normal.   Cardiovascular:      Rate and Rhythm: Normal rate and regular rhythm.      Pulses: Normal pulses.      Heart sounds: Normal heart sounds.   Pulmonary:      Effort: Pulmonary effort is normal. No respiratory distress.      Breath sounds: Normal breath sounds. No stridor. No wheezing or rhonchi.   Musculoskeletal:         General: Normal range of motion.      Cervical back: Normal range of motion and neck supple. No rigidity.   Lymphadenopathy:      Cervical: No cervical adenopathy.   Skin:     General: Skin is warm and dry.      Capillary Refill: Capillary refill takes less than 2 seconds.      Findings: No rash.   Neurological:      General: No focal deficit present.      Mental Status: She is alert and oriented to person, place, and time.   Psychiatric:         Mood and Affect: Mood normal.         Behavior: Behavior normal.          ASSESSMENT AND PLAN:   Tonya Mcclellan is a 73 year old female who presents with URI symptoms that are consistent with:      ASSESSMENT:  Encounter Diagnoses   Name Primary?    Acute sinusitis with symptoms greater than 10 days Yes    Bilateral impacted cerumen        PLAN: Education provided.  Questions answered.  Reassurance given. Discussed with patient viral vs. bacterial etiology of sinusitis. Will treat with antibiotics and nasal steroids. Medications as listed below. Risks, benefits, side effects of medication addressed and explained. Advised patient to take daily antihistamine. Recommend OTC Debrox for cerumen impaction. Advised patient to continue supportive care: maintain hydration and pain management with OTC Tylenol.  Comfort Care as listed in Patient Instructions. The patient indicates understanding of these issues and agrees to the plan. The patient is asked to f/u with PCP if sx's persist or worsen.       Meds & Refills for this Visit:  Requested Prescriptions     Signed Prescriptions Disp Refills    amoxicillin clavulanate  875-125 MG Oral Tab 14 tablet 0     Sig: Take 1 tablet by mouth 2 (two) times daily for 7 days.    Fluticasone Furoate (FLONASE SENSIMIST) 27.5 MCG/SPRAY Nasal Suspension 9.1 mL 0     Si sprays by Nasal route daily for 7 days.

## 2024-04-01 ENCOUNTER — HOSPITAL ENCOUNTER (OUTPATIENT)
Age: 74
Discharge: HOME | End: 2024-04-01
Attending: EMERGENCY MEDICINE
Payer: MEDICARE

## 2024-04-01 VITALS
TEMPERATURE: 98 F | HEART RATE: 85 BPM | SYSTOLIC BLOOD PRESSURE: 147 MMHG | OXYGEN SATURATION: 95 % | DIASTOLIC BLOOD PRESSURE: 78 MMHG | RESPIRATION RATE: 18 BRPM

## 2024-04-01 DIAGNOSIS — J20.9 ACUTE BRONCHITIS, UNSPECIFIED ORGANISM: Primary | ICD-10-CM

## 2024-04-01 PROCEDURE — 99213 OFFICE O/P EST LOW 20 MIN: CPT

## 2024-04-01 PROCEDURE — 99214 OFFICE O/P EST MOD 30 MIN: CPT

## 2024-04-01 RX ORDER — BENZONATATE 100 MG/1
100 CAPSULE ORAL 3 TIMES DAILY PRN
Qty: 30 CAPSULE | Refills: 0 | Status: SHIPPED | OUTPATIENT
Start: 2024-04-01 | End: 2024-05-01

## 2024-04-01 NOTE — ED PROVIDER NOTES
Patient Seen in: [facility]      History     Chief Complaint   Patient presents with    Cough/URI     Stated Complaint: sinus    Subjective:   HPI    This is a 73-year-old female presents to the immediate care with complaint of cough and congestion.  Patient was recently treated for a sinus infection was placed on Augmentin.  She reports her sinus pressure has improved however she is developed coughing fits which has been mildly productive of clear sputum.  There are no known aggravating or alleviating factors.  Patient denies fever or shortness of breath.  Alert    Objective:   Past Medical History:   Diagnosis Date    Arrhythmia     Gilbert disease     Hyperlipidemia               Past Surgical History:   Procedure Laterality Date    ORAL SURGERY      TONSILLECTOMY                  No pertinent social history.            Review of Systems   Constitutional: Negative.    Respiratory:  Positive for cough and chest tightness.    Cardiovascular: Negative.    Gastrointestinal: Negative.    Skin: Negative.    Neurological: Negative.    All other systems reviewed and are negative.      Positive for stated complaint: sinus  Other systems are as noted in HPI.  Constitutional and vital signs reviewed.      All other systems reviewed and negative except as noted above.    Physical Exam     ED Triage Vitals [04/01/24 1116]   /78   Pulse 85   Resp 18   Temp 98.4 °F (36.9 °C)   Temp src Temporal   SpO2 95 %   O2 Device None (Room air)       Current:/78   Pulse 85   Temp 98.4 °F (36.9 °C) (Temporal)   Resp 18   SpO2 95%         Physical Exam  Vitals reviewed.   Constitutional:       General: She is not in acute distress.     Appearance: Normal appearance. She is normal weight. She is not ill-appearing, toxic-appearing or diaphoretic.   HENT:      Head: Normocephalic and atraumatic.      Mouth/Throat:      Mouth: Mucous membranes are moist.   Cardiovascular:      Rate and Rhythm: Normal rate and  regular rhythm.      Pulses: Normal pulses.      Heart sounds: Normal heart sounds.   Pulmonary:      Effort: Pulmonary effort is normal.      Breath sounds: Normal breath sounds.   Musculoskeletal:         General: Normal range of motion.      Cervical back: Normal range of motion.   Skin:     General: Skin is warm.   Neurological:      General: No focal deficit present.      Mental Status: She is alert and oriented to person, place, and time. Mental status is at baseline.   Psychiatric:         Mood and Affect: Mood normal.         Behavior: Behavior normal.         Thought Content: Thought content normal.         Judgment: Judgment normal.             ED Course   Labs Reviewed - No data to display                MDM                                         Medical Decision Making  Medical decision making  Multiple diagnoses considered in the evaluation of this patient.  Vital signs reviewed and are stable. Differential diagnosis considered include, but not limited to:     Acute bronchitis    Diagnosis: Acute bronchitis      Treatment Plan: Over-the-counter decongestant, Tessalon Perles          Disposition and Plan     Clinical Impression:  1. Acute bronchitis, unspecified organism         Disposition:  Discharge  4/1/2024 11:35 am    Follow-up:  Rachele Tamez  [address] [place] [id number] [phone]    In 1 week  As needed, If symptoms worsen          Medications Prescribed:  Current Discharge Medication List        START taking these medications    Details   benzonatate 100 MG Oral Cap Take 1 capsule (100 mg total) by mouth 3 (three) times daily as needed for cough.  Qty: 30 capsule, Refills: 0

## 2024-04-01 NOTE — ED INITIAL ASSESSMENT (HPI)
Pt c/o sinus pressure, congestion x 3 weeks, treated with Augmentin for sinusitis two weeks ago, states better but now c/o worsening cough and chest tightness. Denies fever or SOB

## 2024-05-16 ENCOUNTER — OFFICE VISIT (OUTPATIENT)
Dept: DERMATOLOGY CLINIC | Facility: CLINIC | Age: 74
End: 2024-05-16

## 2024-05-16 DIAGNOSIS — L81.4 SOLAR LENTIGO: ICD-10-CM

## 2024-05-16 DIAGNOSIS — D23.9 BENIGN NEOPLASM OF SKIN, UNSPECIFIED LOCATION: ICD-10-CM

## 2024-05-16 DIAGNOSIS — D22.9 MULTIPLE MELANOCYTIC NEVI: ICD-10-CM

## 2024-05-16 DIAGNOSIS — L57.0 AK (ACTINIC KERATOSIS): Primary | ICD-10-CM

## 2024-05-16 DIAGNOSIS — D22.9 MULTIPLE NEVI: ICD-10-CM

## 2024-05-16 DIAGNOSIS — L82.1 SEBORRHEIC KERATOSES: ICD-10-CM

## 2024-05-16 PROCEDURE — 1160F RVW MEDS BY RX/DR IN RCRD: CPT | Performed by: DERMATOLOGY

## 2024-05-16 PROCEDURE — 1159F MED LIST DOCD IN RCRD: CPT | Performed by: DERMATOLOGY

## 2024-05-16 PROCEDURE — 99213 OFFICE O/P EST LOW 20 MIN: CPT | Performed by: DERMATOLOGY

## 2024-05-28 NOTE — PROGRESS NOTES
Tonya Mcclellan is a 73 year old female.  HPI:     CC:    Chief Complaint   Patient presents with    Full Skin Exam     LOV 10/23/23. Patient present for FBSE. Has a \"few spots of concern, that she would like to be examined\". Denies personal or family Hx of skin cancer.         Allergies:  Patient has no known allergies.    HISTORY:    Past Medical History:    Arrhythmia    Gilbert disease    Hyperlipidemia      Past Surgical History:   Procedure Laterality Date    Oral surgery      Tonsillectomy        Family History   Problem Relation Age of Onset    Ulcerative Colitis Mother     Other (Renal cell cancer) Father     Other (cad) Brother       Social History     Socioeconomic History    Marital status:    Tobacco Use    Smoking status: Former     Types: Cigarettes    Smokeless tobacco: Never    Tobacco comments:     as a teen   Vaping Use    Vaping status: Never Used   Substance and Sexual Activity    Alcohol use: Yes     Comment: occasionally    Drug use: No   Other Topics Concern    History of tanning Yes    Reaction to local anesthetic No    Pt has a pacemaker No    Pt has a defibrillator No     Social Determinants of Health      Received from [facility], [facility]        Current Outpatient Medications   Medication Sig Dispense Refill    Ascorbic Acid (VITAMIN C ER OR) Vitamin C      ZINC OR zinc      dilTIAZem HCl ER Coated Beads 240 MG Oral Capsule SR 24 Hr Take 1 capsule (240 mg total) by mouth daily. 90 capsule 3    Rosuvastatin Calcium 10 MG Oral Tab Take 1 tablet (10 mg total) by mouth nightly. 90 tablet 2    Coenzyme Q10 (COQ-10) 10 MG Oral Cap Take 1 capsule by mouth.      FLOWFLEX COVID-19 AG HOME TEST In Vitro Kit FOR PERSONAL USE ONLY. NOT FOR EMPLOYMENT PURPOSES OR FOR RESALE. (Patient not taking: Reported on 4/10/2023)      fluticasone propionate 50 MCG/ACT Nasal Suspension fluticasone propionate 50 mcg/actuation nasal spray,suspension   2 sprays by Nasal route daily.  (Patient not taking: Reported on 10/10/2022)      pseudoephedrine 30 MG Oral Tab Nasal Decongestant (pseudoephedrine) 30 mg tablet   TAKE ONE TABLET BY MOUTH EVERY FOUR HOURS AS NEEDED FOR CONGESTION (Patient not taking: Reported on 10/10/2022)      mupirocin 2 % External Ointment  (Patient not taking: Reported on 10/10/2022)       Allergies:   No Known Allergies    Past Medical History:    Arrhythmia    Gilbert disease    Hyperlipidemia     Past Surgical History:   Procedure Laterality Date    Oral surgery      Tonsillectomy       Social History     Socioeconomic History    Marital status:      Spouse name: Not on file    Number of children: Not on file    Years of education: Not on file    Highest education level: Not on file   Occupational History    Not on file   Tobacco Use    Smoking status: Former     Types: Cigarettes    Smokeless tobacco: Never    Tobacco comments:     as a teen   Vaping Use    Vaping status: Never Used   Substance and Sexual Activity    Alcohol use: Yes     Comment: occasionally    Drug use: No    Sexual activity: Not on file   Other Topics Concern    Grew up on a farm Not Asked    History of tanning Yes    Outdoor occupation Not Asked    Breast feeding Not Asked    Reaction to local anesthetic No    Pt has a pacemaker No    Pt has a defibrillator No   Social History Narrative    Not on file     Social Determinants of Health     Financial Resource Strain: Not on file   Food Insecurity: Not on file   Transportation Needs: Not on file   Physical Activity: Not on file   Stress: Not on file   Social Connections: Not on file   Housing Stability: At Risk (8/18/2023)    [facility]     Living Situation: Not on file     Housing Problems: Not on file     Family History   Problem Relation Age of Onset    Ulcerative Colitis Mother     Other (Renal cell cancer) Father     Other (cad) Brother        There were no vitals filed for this  visit.    HPI:  Chief Complaint   Patient presents with    Full Skin Exam     LOV 10/23/23. Patient present for FBSE. Has a \"few spots of concern, that she would like to be examined\". Denies personal or family Hx of skin cancer.     Patient with recent episode of COVID again has been slowly recovering    Follow-up history of actinic keratoses.    Has been careful sun protection.  Patient has any particular lesions of concern. had seen Dr. Guardado previously.  Many lesions frozen with past, history of skin cancer    Patient presents with concerns above.    Patient has been in their usual state of health.     Past notes/ records and appropriate/relevant lab results including pathology and past body maps reviewed. Including outside notes/ PCP notes as appropriate. Updated and new information noted in current visit.     ROS:  Denies other relevant systemic complaints.      History, medications, allergies reviewed as noted.       Physical Examination:     Well-developed well-nourished patient alert oriented in no acute distress.  Exam performed, including scalp, head, neck, face,nails, hair, external eyes, including conjunctival mucosa, eyelids, lips external ears , arms, digits,palms.     Multiple light to medium brown, well marginated, uniformly pigmented, macules and papules 6 mm and less scattered on exam. pigmented lesions examined with dermoscopy benign-appearing patterns.     Waxy tannish keratotic papules scattered, cherry-red vascular papules scattered.    See map today's date for lesions noted .  See assessment and plan below for specific lesions.    Otherwise remarkable for lesions as noted on map.    See A/P  below for additional information:    Assessment / plan:    No orders of the defined types were placed in this encounter.      Meds & Refills for this Visit:  Requested Prescriptions      No prescriptions requested or ordered in this encounter         Encounter Diagnoses   Name Primary?    AK (actinic  keratosis) Yes    Multiple melanocytic nevi     Seborrheic keratoses     Multiple nevi     Solar lentigo     Benign neoplasm of skin, unspecified location        Actinic Keratoses.  Precancerous nature discussed. Sun protection, sunscreen/ blocks encouraged .  Monitoring for new lesions.  Sun damage additional recurrent and new actinic keratoses, skin cancers may occur in areas of prior actinic keratoses, related to past sun exposure to minimize current sun exposure.  Sunscreen applied consistently regularly, reapplication and sun protection while driving recommended.  Lesions over the mid cheeks monitor    Psoriasiform dermatitis over the upper arms  Topicals reviewed      Exam otherwise unchanged  Numerous SKs irritated over the temples chest upper back  Other waxy tan papules, lentigines over the back shoulders, more inflamed lesion at left shoulder  Reassurance consider trial of cryo    Scattered other benign keratoses lentigines   Waxy tan keratotic papules lesions in areas of concern as noted reassurance given.  Benign nature discussed.  Possibility of cryo, alphahydroxy acids over-the-counter retinol's discussed.  Lentigines, sun damage continue sun protection regular skin checks      Split earlobe follow-up with Dr. Sabillon for repair right earlobe  No other susupicious lesions on todays  exam.    Follow-up for full skin exam 6 months    Please refer to map for specific lesions.  See additional diagnoses.  Pros cons of various therapies, risks benefits discussed.Pathophysiology discussed with patient.  Therapeutic options reviewed.  See  Medications in grid.  Instructions reviewed at length.    Benign nevi, seborrheic  keratoses, cherry angiomas:  Reassurance regarding other benign skin lesions.Signs and symptoms of skin cancer, ABCDE's of melanoma discussed with patient. Sunscreen use, sun protection, self exams reviewed.  Followup as noted RTC ---routine checkup    6 mos -one year or p.r.n.    Encounter  Times   Including precharting, reviewing chart, prior notes obtaining history: 10 minutes, medical exam :10 minutes, notes on body map, plan, counseling right cataract  Reassurance regarding benign minutes My total time spent caring for the patient on the day of the encounter: 30 minutes     The patient indicates understanding of these issues and agrees to the plan.  The patient is asked to return as noted in follow-up/ above.    This note was generated using Dragon voice recognition software.  Please contact me regarding any confusion resulting from errors in recognition..   Note to patient and family: The 21st Century Cures Act makes medical notes like these available to patients. However, be advised this is a medical document. It is intended as [id number] communication and monitoring of a patient's care needs. It is written in medical language and may contain abbreviations or verbiage that are unfamiliar. It may appear blunt or direct. Medical documents are intended to carry relevant information, facts as evident and the clinical opinion of the practitioner.

## 2024-10-15 ENCOUNTER — OFFICE VISIT (OUTPATIENT)
Dept: FAMILY MEDICINE CLINIC | Facility: CLINIC | Age: 74
End: 2024-10-15
Payer: COMMERCIAL

## 2024-10-15 VITALS
WEIGHT: 128.19 LBS | TEMPERATURE: 99 F | BODY MASS INDEX: 25.84 KG/M2 | OXYGEN SATURATION: 98 % | RESPIRATION RATE: 18 BRPM | HEART RATE: 109 BPM | DIASTOLIC BLOOD PRESSURE: 86 MMHG | HEIGHT: 59 IN | SYSTOLIC BLOOD PRESSURE: 136 MMHG

## 2024-10-15 DIAGNOSIS — U07.1 COVID: Primary | ICD-10-CM

## 2024-10-15 DIAGNOSIS — J06.9 VIRAL URI: ICD-10-CM

## 2024-10-15 LAB
OPERATOR ID: ABNORMAL
RAPID SARS-COV-2 BY PCR: DETECTED

## 2024-10-15 PROCEDURE — 3008F BODY MASS INDEX DOCD: CPT | Performed by: NURSE PRACTITIONER

## 2024-10-15 PROCEDURE — U0002 COVID-19 LAB TEST NON-CDC: HCPCS | Performed by: NURSE PRACTITIONER

## 2024-10-15 PROCEDURE — 3075F SYST BP GE 130 - 139MM HG: CPT | Performed by: NURSE PRACTITIONER

## 2024-10-15 PROCEDURE — 99213 OFFICE O/P EST LOW 20 MIN: CPT | Performed by: NURSE PRACTITIONER

## 2024-10-15 PROCEDURE — 1159F MED LIST DOCD IN RCRD: CPT | Performed by: NURSE PRACTITIONER

## 2024-10-15 PROCEDURE — 1160F RVW MEDS BY RX/DR IN RCRD: CPT | Performed by: NURSE PRACTITIONER

## 2024-10-15 PROCEDURE — 3079F DIAST BP 80-89 MM HG: CPT | Performed by: NURSE PRACTITIONER

## 2024-10-15 NOTE — PROGRESS NOTES
Chief Complaint   Patient presents with    Nasal Congestion     2 days w/ nasal congestion, sinus headache, body aches, scratchy throat and fatigue.   :    HPI:   Tonya Mcclellan is a 73 year old female who presents for upper respiratory symptoms for  2  days. Started suddenly.  Symptoms have been worsening since onset.  Feeling congestion,  body aches, fatigue, rhinorrhea. No home treatments.     Current Outpatient Medications   Medication Sig Dispense Refill    Ascorbic Acid (VITAMIN C ER OR) Vitamin C      ZINC OR zinc      dilTIAZem HCl ER Coated Beads 240 MG Oral Capsule SR 24 Hr Take 1 capsule (240 mg total) by mouth daily. 90 capsule 3    Rosuvastatin Calcium 10 MG Oral Tab Take 1 tablet (10 mg total) by mouth nightly. 90 tablet 2    Coenzyme Q10 (COQ-10) 10 MG Oral Cap Take 1 capsule by mouth.      FLOWFLEX COVID-19 AG HOME TEST In Vitro Kit FOR PERSONAL USE ONLY. NOT FOR EMPLOYMENT PURPOSES OR FOR RESALE. (Patient not taking: Reported on 4/10/2023)      fluticasone propionate 50 MCG/ACT Nasal Suspension fluticasone propionate 50 mcg/actuation nasal spray,suspension   2 sprays by Nasal route daily. (Patient not taking: Reported on 10/10/2022)      pseudoephedrine 30 MG Oral Tab Nasal Decongestant (pseudoephedrine) 30 mg tablet   TAKE ONE TABLET BY MOUTH EVERY FOUR HOURS AS NEEDED FOR CONGESTION (Patient not taking: Reported on 10/10/2022)      mupirocin 2 % External Ointment  (Patient not taking: Reported on 10/10/2022)        Past Medical History:    Arrhythmia    Gilbert disease    Hyperlipidemia      Past Surgical History:   Procedure Laterality Date    Oral surgery      Tonsillectomy        Family History   Problem Relation Age of Onset    Ulcerative Colitis Mother     Other (Renal cell cancer) Father     Other (cad) Brother       Social History     Socioeconomic History    Marital status:    Tobacco Use    Smoking status: Former     Types: Cigarettes    Smokeless tobacco: Never    Tobacco  comments:     as a teen   Vaping Use    Vaping status: Never Used   Substance and Sexual Activity    Alcohol use: Yes     Comment: occasionally    Drug use: No   Other Topics Concern    History of tanning Yes    Reaction to local anesthetic No    Pt has a pacemaker No    Pt has a defibrillator No     Social Drivers of Health      [facility] [employer] [employer]    [facility]         REVIEW OF SYSTEMS:   GENERAL: see HPI  SKIN: no rashes  EYES:denies blurred vision or double vision  HEENT: congested; see HPI  CHEST: no chest pains, palpitations.  LUNGS: denies shortness of breath with exertion or rest.  CARDIOVASCULAR: denies chest pain on exertion  GI: no nausea or abdominal pain, diarrhea.  URO: no decreased urination.      EXAM:   /86   Pulse 109   Temp 98.7 °F (37.1 °C)   Resp 18   Ht 4' 11\" (1.499 m)   Wt 128 lb 3.2 oz (58.2 kg)   SpO2 98%   BMI 25.89 kg/m²   GENERAL: well developed, well nourished, in no apparent distress  SKIN: no rashes,no suspicious lesions  HEAD: atraumatic, normocephalic  EYES: conjunctiva clear, EOM intact  EARS: TM's clear gray, no bulging, no retraction, no fluid, bony landmarks intact  NOSE: nostrils patent, clear nasal mucous, nasal mucosa reddened and swollen  THROAT: oral mucosa pink, moist. No visible dental caries. Posterior pharynx is is not erythematous. no exudates. +PND  NECK: supple, non-tender  LUNGS: clear to auscultation bilaterally. Breathing is non labored.    CARDIO: slightly tachy without murmur  GI: good BS's,no masses, HSM or tenderness  EXTREMITIES: no cyanosis, clubbing or edema  LYMPH:  no cervical lymphadenopathy.        ASSESSMENT AND PLAN:   Tonya Mcclellan is a 73 year old female who presents with influenza.    ASSESSMENT:  Encounter Diagnoses   Name Primary?    Viral URI     COVID Yes     Rapid covid +    PLAN:     Patient is currently on Diltiazem for A-fib. Discusses interactions with Paxlovid. Advised comfort care.   Comfort care  as described in Patient Instructions  Rest, increase fluids,ibuprofen q 6 hours for fever/aches prn. Coricidin  The patient is asked to f/u with PCP in 3-4 days if sx's persist. Seek immediate medical attention for acute or worsening symptoms. The patient indicates understanding of these issues and agrees to the plan.  The patient indicates understanding of these issues and agrees to the plan.

## 2024-10-24 ENCOUNTER — OFFICE VISIT (OUTPATIENT)
Dept: DERMATOLOGY CLINIC | Facility: CLINIC | Age: 74
End: 2024-10-24

## 2024-10-24 DIAGNOSIS — L57.8 SUN-DAMAGED SKIN: ICD-10-CM

## 2024-10-24 DIAGNOSIS — D22.9 MULTIPLE NEVI: ICD-10-CM

## 2024-10-24 DIAGNOSIS — L81.4 SOLAR LENTIGO: ICD-10-CM

## 2024-10-24 DIAGNOSIS — L57.0 AK (ACTINIC KERATOSIS): Primary | ICD-10-CM

## 2024-10-24 DIAGNOSIS — L82.1 SEBORRHEIC KERATOSES: ICD-10-CM

## 2024-10-24 DIAGNOSIS — D22.9 MULTIPLE MELANOCYTIC NEVI: ICD-10-CM

## 2024-10-24 DIAGNOSIS — D23.9 BENIGN NEOPLASM OF SKIN, UNSPECIFIED LOCATION: ICD-10-CM

## 2024-10-24 PROCEDURE — 1160F RVW MEDS BY RX/DR IN RCRD: CPT | Performed by: DERMATOLOGY

## 2024-10-24 PROCEDURE — 99213 OFFICE O/P EST LOW 20 MIN: CPT | Performed by: DERMATOLOGY

## 2024-10-24 PROCEDURE — 1159F MED LIST DOCD IN RCRD: CPT | Performed by: DERMATOLOGY

## 2024-10-24 PROCEDURE — G2211 COMPLEX E/M VISIT ADD ON: HCPCS | Performed by: DERMATOLOGY

## 2024-10-24 RX ORDER — IMIQUIMOD 12.5 MG/.25G
CREAM TOPICAL
Qty: 12 EACH | Refills: 1 | Status: SHIPPED | OUTPATIENT
Start: 2024-10-24

## 2024-10-24 NOTE — PROGRESS NOTES
Tonya Mcclellan is a 73 year old female.  HPI:     CC:    Chief Complaint   Patient presents with    Upper Body Exam     LOV 5/16/24. Pt with Hx of Ak's, presents for UBSE. Has a few spots of concern on face and chest. Denies dryness or pain.         Allergies:  Patient has no known allergies.    HISTORY:    Past Medical History:    Arrhythmia    Gilbert disease    Hyperlipidemia      Past Surgical History:   Procedure Laterality Date    Oral surgery      Tonsillectomy        Family History   Problem Relation Age of Onset    Ulcerative Colitis Mother     Other (Renal cell cancer) Father     Other (cad) Brother       Social History     Socioeconomic History    Marital status:    Tobacco Use    Smoking status: Former     Types: Cigarettes    Smokeless tobacco: Never    Tobacco comments:     as a teen   Vaping Use    Vaping status: Never Used   Substance and Sexual Activity    Alcohol use: Yes     Comment: occasionally    Drug use: No   Other Topics Concern    History of tanning Yes    Reaction to local anesthetic No    Pt has a pacemaker No    Pt has a defibrillator No     Social Drivers of Health      [facility] [employer] [employer]    [facility]        Current Outpatient Medications   Medication Sig Dispense Refill    Imiquimod 5 % External Cream Apply topically 2 times every week to actinic keratoses on forehead/brows, cheeks, nose x 6 weeks. 12 each 1    Ascorbic Acid (VITAMIN C ER OR) Vitamin C      ZINC OR zinc      dilTIAZem HCl ER Coated Beads 240 MG Oral Capsule SR 24 Hr Take 1 capsule (240 mg total) by mouth daily. 90 capsule 3    Rosuvastatin Calcium 10 MG Oral Tab Take 1 tablet (10 mg total) by mouth nightly. 90 tablet 2    Coenzyme Q10 (COQ-10) 10 MG Oral Cap Take 1 capsule by mouth.      FLOWFLEX COVID-19 AG HOME TEST In Vitro Kit FOR PERSONAL USE ONLY. NOT FOR EMPLOYMENT PURPOSES OR FOR RESALE. (Patient not taking: Reported on 10/24/2024)      fluticasone propionate 50 MCG/ACT Nasal  Suspension fluticasone propionate 50 mcg/actuation nasal spray,suspension   2 sprays by Nasal route daily. (Patient not taking: Reported on 10/24/2024)      pseudoephedrine 30 MG Oral Tab Nasal Decongestant (pseudoephedrine) 30 mg tablet   TAKE ONE TABLET BY MOUTH EVERY FOUR HOURS AS NEEDED FOR CONGESTION (Patient not taking: Reported on 10/24/2024)      mupirocin 2 % External Ointment  (Patient not taking: Reported on 10/24/2024)       Allergies:   No Known Allergies    Past Medical History:    Arrhythmia    Gilbert disease    Hyperlipidemia     Past Surgical History:   Procedure Laterality Date    Oral surgery      Tonsillectomy       Social History     Socioeconomic History    Marital status:      Spouse name: Not on file    Number of children: Not on file    Years of education: Not on file    Highest education level: Not on file   Occupational History    Not on file   Tobacco Use    Smoking status: Former     Types: Cigarettes    Smokeless tobacco: Never    Tobacco comments:     as a teen   Vaping Use    Vaping status: Never Used   Substance and Sexual Activity    Alcohol use: Yes     Comment: occasionally    Drug use: No    Sexual activity: Not on file   Other Topics Concern    Grew up on a farm Not Asked    History of tanning Yes    Outdoor occupation Not Asked    Breast feeding Not Asked    Reaction to local anesthetic No    Pt has a pacemaker No    Pt has a defibrillator No   Social History Narrative    Not on file     Social Drivers of Health     Financial Resource Strain: Not on file   Food Insecurity: Not on file   Transportation Needs: Not on file   Physical Activity: Not on file   Stress: Not on file   Social Connections: Not on file   Housing Stability: At Risk (8/18/2023)    [facility] [place], [facility]     Living Situation: Not on file     Housing Problems: Not on file     Family History   Problem Relation Age of Onset    Ulcerative Colitis Mother     Other (Renal  cell cancer) Father     Other (cad) Brother        There were no vitals filed for this visit.    HPI:  Chief Complaint   Patient presents with    Upper Body Exam     LOV 5/16/24. Pt with Hx of Ak's, presents for UBSE. Has a few spots of concern on face and chest. Denies dryness or pain.     Patient with recent episode of COVID again has been slowly recovering    Follow-up history of actinic keratoses.    Has been careful sun protection.  Patient has any particular lesions of concern. had seen Dr. Guardado previously.  Many lesions frozen with past, history of skin cancer    Patient presents with concerns above.    Patient has been in their usual state of health.     Past notes/ records and appropriate/relevant lab results including pathology and past body maps reviewed. Including outside notes/ PCP notes as appropriate. Updated and new information noted in current visit.     ROS:  Denies other relevant systemic complaints.      History, medications, allergies reviewed as noted.       Physical Examination:     Well-developed well-nourished patient alert oriented in no acute distress.  Exam performed, including scalp, head, neck, face,nails, hair, external eyes, including conjunctival mucosa, eyelids, lips external ears , arms, digits,palms.     Multiple light to medium brown, well marginated, uniformly pigmented, macules and papules 6 mm and less scattered on exam. pigmented lesions examined with dermoscopy benign-appearing patterns.     Waxy tannish keratotic papules scattered, cherry-red vascular papules scattered.    See map today's date for lesions noted .  See assessment and plan below for specific lesions.    Otherwise remarkable for lesions as noted on map.    See A/P  below for additional information:    Assessment / plan:    No orders of the defined types were placed in this encounter.      Meds & Refills for this Visit:  Requested Prescriptions     Signed Prescriptions Disp Refills    Imiquimod 5 % External  Cream 12 each 1     Sig: Apply topically 2 times every week to actinic keratoses on forehead/brows, cheeks, nose x 6 weeks.         Encounter Diagnoses   Name Primary?    AK (actinic keratosis) Yes    Solar lentigo     Multiple melanocytic nevi     Seborrheic keratoses     Multiple nevi     Benign neoplasm of skin, unspecified location     Sun-damaged skin          Patient seen for follow-up long-term monitoring, treatment of    Plan of care:  ongoing surveillance, monitoring including regular follow-up due to longer term risk of recurrence, new lesions.  See previous notes.  There is a longitudinal care relationship with me, the care plan reflects the ongoing nature of the continuous relationship of care, and the medical record indicates that there is ongoing treatment of a serious/complex medical condition which I am currently managing.  is Applicable       Actinic Keratoses.  Precancerous nature discussed. Sun protection, sunscreen/ blocks encouraged .  Monitoring for new lesions.  Sun damage additional recurrent and new actinic keratoses, skin cancers may occur in areas of prior actinic keratoses, related to past sun exposure to minimize current sun exposure.  Sunscreen applied consistently regularly, reapplication and sun protection while driving recommended.  Lesions over the mid cheeks monitor    Psoriasiform dermatitis over the upper arms  Topicals reviewed      Exam otherwise unchanged  Numerous SKs irritated over the temples chest upper back  Other waxy tan papules, lentigines over the back shoulders, more inflamed lesion at left shoulder  Reassurance consider trial of cryo    Scattered other benign keratoses lentigines   Waxy tan keratotic papules lesions in areas of concern as noted reassurance given.  Benign nature discussed.  Possibility of cryo, alphahydroxy acids over-the-counter retinol's discussed.  Lentigines, sun damage continue sun protection regular skin checks      Split earlobe follow-up  with Dr. Sabillon for repair right earlobe  No other susupicious lesions on todays  exam.    Follow-up for full skin exam 6 months    Please refer to map for specific lesions.  See additional diagnoses.  Pros cons of various therapies, risks benefits discussed.Pathophysiology discussed with patient.  Therapeutic options reviewed.  See  Medications in grid.  Instructions reviewed at length.    Benign nevi, seborrheic  keratoses, cherry angiomas:  Reassurance regarding other benign skin lesions.Signs and symptoms of skin cancer, ABCDE's of melanoma discussed with patient. Sunscreen use, sun protection, self exams reviewed.  Followup as noted RTC ---routine checkup    6 mos -one year or p.r.n.    Encounter Times   Including precharting, reviewing chart, prior notes obtaining history: 10 minutes, medical exam :10 minutes, notes on body map, plan, counseling right cataract  Reassurance regarding benign minutes My total time spent caring for the patient on the day of the encounter: 30 minutes     The patient indicates understanding of these issues and agrees to the plan.  The patient is asked to return as noted in follow-up/ above.    This note was generated using Dragon voice recognition software.  Please contact me regarding any confusion resulting from errors in recognition..   Note to patient and family: The 21st Century Cures Act makes medical notes like these available to patients. However, be advised this is a medical document. It is intended as [id number] communication and monitoring of a patient's care needs. It is written in medical language and may contain abbreviations or verbiage that are unfamiliar. It may appear blunt or direct. Medical documents are intended to carry relevant information, facts as evident and the clinical opinion of the practitioner.

## 2024-11-15 ENCOUNTER — OFFICE VISIT (OUTPATIENT)
Dept: FAMILY MEDICINE CLINIC | Facility: CLINIC | Age: 74
End: 2024-11-15
Payer: COMMERCIAL

## 2024-11-15 VITALS
TEMPERATURE: 98 F | HEART RATE: 78 BPM | BODY MASS INDEX: 26 KG/M2 | DIASTOLIC BLOOD PRESSURE: 84 MMHG | RESPIRATION RATE: 16 BRPM | HEIGHT: 59 IN | SYSTOLIC BLOOD PRESSURE: 138 MMHG | OXYGEN SATURATION: 97 % | WEIGHT: 129 LBS

## 2024-11-15 DIAGNOSIS — J02.9 SORE THROAT: Primary | ICD-10-CM

## 2024-11-15 PROCEDURE — 87081 CULTURE SCREEN ONLY: CPT | Performed by: NURSE PRACTITIONER

## 2024-11-15 PROCEDURE — 87880 STREP A ASSAY W/OPTIC: CPT | Performed by: NURSE PRACTITIONER

## 2024-11-15 PROCEDURE — 99213 OFFICE O/P EST LOW 20 MIN: CPT | Performed by: NURSE PRACTITIONER

## 2024-11-15 NOTE — PATIENT INSTRUCTIONS
If we send out a throat culture, we will contact you with the results in 48-72 hours. If positive, then we will call in an appropriate antibiotic. If negative, then the sore throat is most likely viral in origin and should resolve within 7-10 days.     Comfort measures explained and discussed:    OTC Tylenol/ibuprofen as needed.    Push fluids- warm or cool liquids, whichever is soothing for patient.     Avoid caffeine.    Do not share utensils or drinks with anyone.    Good handwashing.    Get plenty of rest.    Can use over the counter benzocaine such as Cepacol throat lozenges or Chloroseptic throat spray to soothe sore throat.    Warm salt water gargles 2-3 times daily for at least 3 days.  Start daily non-drowsy antihistamine.      If strep test is results are positive:    Take the full course of your antibiotic even if you are feeling better.   You are considered to be contagious until you have been on antibiotics for 24 hours.   You can return to school and/or work once on antibiotics for 24 hours  Change tooth brush two days into therapy  Follow up in 3-5 days if not improving, condition worsens, or fever greater than or equal to 100.4 persists for 72 hours.  Follow up in 3-5 days if not improving, condition worsens, or fever greater than or equal to 100.4 persists for 72 hours.

## 2024-11-15 NOTE — PROGRESS NOTES
CHIEF COMPLAINT:     Chief Complaint   Patient presents with    Sore Throat     Sx 3 days - Intermittent ST  Sx this AM - ST has been consistent  Denies fever, chills, body aches, ear pain/pressure, nasal congestion, runny nose, rash, loss of appetite, nausea, vomiting, diarrhea, sinus pressure  No Covid test was done at home  No OTC: Emergen-C       HPI:     Tonya Mcclellan is a 73 year old female presents to clinic with symptoms of sore throat. Patient has had for 2 days. Symptoms have intermittent since onset.  Patient reports following associated symptoms:  hurts to swallow, more consistent this am.  Slight sinus pressure today but has seasonal allergies.  Denies fever, chills, headache, stomach upset, rash, runny nose, cough, ear pain.    Treating symptoms with: vit c.   Has no history of strep. No sick contacts at home or other ill exposure.   Had covid 1 month ago and was tx with paxlovid, recovered well.    Current Outpatient Medications   Medication Sig Dispense Refill    Imiquimod 5 % External Cream Apply topically 2 times every week to actinic keratoses on forehead/brows, cheeks, nose x 6 weeks. 12 each 1    mupirocin 2 % External Ointment       Ascorbic Acid (VITAMIN C ER OR) Vitamin C      ZINC OR zinc      dilTIAZem HCl ER Coated Beads 240 MG Oral Capsule SR 24 Hr Take 1 capsule (240 mg total) by mouth daily. 90 capsule 3    Rosuvastatin Calcium 10 MG Oral Tab Take 1 tablet (10 mg total) by mouth nightly. 90 tablet 2    Coenzyme Q10 (COQ-10) 10 MG Oral Cap Take 1 capsule by mouth.        Past Medical History:    Arrhythmia    Gilbert disease    Hyperlipidemia      Social History:  Social History     Socioeconomic History    Marital status:    Tobacco Use    Smoking status: Former     Types: Cigarettes    Smokeless tobacco: Never    Tobacco comments:     as a teen   Vaping Use    Vaping status: Never Used   Substance and Sexual Activity    Alcohol use: Yes     Comment: occasionally    Drug  use: No   Other Topics Concern    History of tanning Yes    Reaction to local anesthetic No    Pt has a pacemaker No    Pt has a defibrillator No     Social Drivers of Health      Received from [employer] [employer]    [facility]        REVIEW OF SYSTEMS:   GENERAL HEALTH:  See HPI  SKIN: denies any unusual skin lesions or rashes  HEENT: See HPI  RESPIRATORY: denies shortness of breath, or wheezing  CARDIOVASCULAR: denies chest pain, palpitations   GI: denies abdominal pain, vomiting, constipation and diarrhea. normal appetite  NEURO: denies dizziness or lightheadedness      EXAM:   /84   Pulse 78   Temp 97.8 °F (36.6 °C) (Tympanic)   Resp 16   Ht 4' 11\" (1.499 m)   Wt 129 lb (58.5 kg)   SpO2 97%   BMI 26.05 kg/m²     Physical Exam  Vitals reviewed.   Constitutional:       General: She is not in acute distress.     Appearance: Normal appearance. She is not ill-appearing.   HENT:      Head: Normocephalic and atraumatic.      Right Ear: Tympanic membrane and ear canal normal.      Left Ear: Tympanic membrane and ear canal normal.      Nose: Nose normal.      Mouth/Throat:      Lips: Pink.      Mouth: Mucous membranes are moist.      Pharynx: Oropharynx is clear. Uvula midline. No posterior oropharyngeal erythema.      Tonsils: 0 on the right. 0 on the left.   Eyes:      Extraocular Movements: Extraocular movements intact.      Conjunctiva/sclera: Conjunctivae normal.   Cardiovascular:      Rate and Rhythm: Normal rate and regular rhythm.      Heart sounds: Normal heart sounds. No murmur heard.  Pulmonary:      Effort: Pulmonary effort is normal.      Breath sounds: Normal breath sounds and air entry.   Abdominal:      General: Bowel sounds are normal.      Palpations: Abdomen is soft.      Tenderness: There is no abdominal tenderness.   Musculoskeletal:      Cervical back: Normal range of motion and neck supple.   Lymphadenopathy:      Cervical: No cervical adenopathy.   Skin:     General: Skin is  warm and dry.      Findings: No rash.   Neurological:      Mental Status: She is alert.   Psychiatric:         Speech: Speech normal.         Behavior: Behavior is cooperative.           Recent Results (from the past 24 hours)   Strep A Assay W/Optic    Collection Time: 11/15/24  9:45 AM   Result Value Ref Range    Strep Grp A Screen Negative Negative    Control Line Present with a clear background (yes/no) Yes Yes/No    Kit Lot # [id number] Numeric    Kit Expiration Date 07/01/2025 Date       ASSESSMENT AND PLAN:   ASSESSMENT:  Encounter Diagnosis   Name Primary?    Sore throat Yes       PLAN:   Discussed that due to symptoms and negative rapid strep this is most likely viral or pnd related and does not require antibiotics.  Will send throat culture.  Comfort care as listed in patient instructions.   Medication as below.    Requested Prescriptions      No prescriptions requested or ordered in this encounter       Risks, benefits, complications and side effects of meds discussed with patient.     Follow up in 3-5 days if not improving, condition worsens, or fever greater than or equal to 100.4 persists for 72 hours.  The patient/parent indicates understanding of these issues and agrees to the plan.    Patient Instructions     If we send out a throat culture, we will contact you with the results in 48-72 hours. If positive, then we will call in an appropriate antibiotic. If negative, then the sore throat is most likely viral in origin and should resolve within 7-10 days.     Comfort measures explained and discussed:    OTC Tylenol/ibuprofen as needed.    Push fluids- warm or cool liquids, whichever is soothing for patient.     Avoid caffeine.    Do not share utensils or drinks with anyone.    Good handwashing.    Get plenty of rest.    Can use over the counter benzocaine such as Cepacol throat lozenges or Chloroseptic throat spray to soothe sore throat.    Warm salt water gargles 2-3 times daily for at least 3  days.  Start daily non-drowsy antihistamine.      If strep test is results are positive:    Take the full course of your antibiotic even if you are feeling better.   You are considered to be contagious until you have been on antibiotics for 24 hours.   You can return to school and/or work once on antibiotics for 24 hours  Change tooth brush two days into therapy  Follow up in 3-5 days if not improving, condition worsens, or fever greater than or equal to 100.4 persists for 72 hours.  Follow up in 3-5 days if not improving, condition worsens, or fever greater than or equal to 100.4 persists for 72 hours.

## 2025-02-07 ENCOUNTER — OFFICE VISIT (OUTPATIENT)
Dept: DERMATOLOGY CLINIC | Facility: CLINIC | Age: 75
End: 2025-02-07

## 2025-02-07 DIAGNOSIS — D22.9 MULTIPLE NEVI: ICD-10-CM

## 2025-02-07 DIAGNOSIS — D22.9 MULTIPLE MELANOCYTIC NEVI: ICD-10-CM

## 2025-02-07 DIAGNOSIS — L57.8 SUN-DAMAGED SKIN: ICD-10-CM

## 2025-02-07 DIAGNOSIS — L82.1 SEBORRHEIC KERATOSES: ICD-10-CM

## 2025-02-07 DIAGNOSIS — L57.0 AK (ACTINIC KERATOSIS): Primary | ICD-10-CM

## 2025-02-07 DIAGNOSIS — D23.9 BENIGN NEOPLASM OF SKIN, UNSPECIFIED LOCATION: ICD-10-CM

## 2025-02-07 DIAGNOSIS — Z12.83 SKIN CANCER SCREENING: ICD-10-CM

## 2025-02-07 DIAGNOSIS — L81.4 SOLAR LENTIGO: ICD-10-CM

## 2025-02-07 PROCEDURE — 1160F RVW MEDS BY RX/DR IN RCRD: CPT | Performed by: DERMATOLOGY

## 2025-02-07 PROCEDURE — 99213 OFFICE O/P EST LOW 20 MIN: CPT | Performed by: DERMATOLOGY

## 2025-02-07 PROCEDURE — 1159F MED LIST DOCD IN RCRD: CPT | Performed by: DERMATOLOGY

## 2025-02-07 PROCEDURE — 17003 DESTRUCT PREMALG LES 2-14: CPT | Performed by: DERMATOLOGY

## 2025-02-07 PROCEDURE — 17000 DESTRUCT PREMALG LESION: CPT | Performed by: DERMATOLOGY

## 2025-02-07 RX ORDER — IMIQUIMOD 12.5 MG/.25G
CREAM TOPICAL
Qty: 12 EACH | Refills: 1 | Status: SHIPPED | OUTPATIENT
Start: 2025-02-07

## 2025-02-07 RX ORDER — AMMONIUM LACTATE 12 G/100G
CREAM TOPICAL
Qty: 385 G | Refills: 11 | Status: SHIPPED | OUTPATIENT
Start: 2025-02-07

## 2025-02-07 NOTE — PROGRESS NOTES
The following individual(s) verbally consented to be recorded using ambient AI listening technology and understand that they can each withdraw their consent to this listening technology at any point by asking the clinician to turn off or pause the recording: Tonya Mcclellan

## 2025-02-16 NOTE — PROGRESS NOTES
Tonya Mcclellan is a 74 year old female.  HPI:     CC:    Chief Complaint   Patient presents with    Upper Body Exam     LOV 10/24/24. Pt presents for UBSE. Pt has lesion on rt forehead, splinter on rt hand palm that is sore.    Pt has hx of AK  Pt denies personal/family hx of Skin Ca         Allergies:  Patient has no known allergies.    HISTORY:    Past Medical History:    Arrhythmia    Gilbert disease    Hyperlipidemia      Past Surgical History:   Procedure Laterality Date    Oral surgery      Tonsillectomy        Family History   Problem Relation Age of Onset    Ulcerative Colitis Mother     Other (Renal cell cancer) Father     Other (cad) Brother       Social History     Socioeconomic History    Marital status:    Tobacco Use    Smoking status: Former     Types: Cigarettes    Smokeless tobacco: Never    Tobacco comments:     as a teen   Vaping Use    Vaping status: Never Used   Substance and Sexual Activity    Alcohol use: Yes     Comment: occasionally    Drug use: No   Other Topics Concern    History of tanning Yes    Reaction to local anesthetic No    Pt has a pacemaker No    Pt has a defibrillator No     Social Drivers of Health      Received from [employer] [employer]    [facility]        Current Outpatient Medications   Medication Sig Dispense Refill    Imiquimod 5 % External Cream Apply topically 2 times every week to actinic keratoses on forehead/brows, cheeks, nose x 6 weeks. 12 each 1    Ammonium Lactate 12 % External Cream Apply to dry skin bid as directed 385 g 11    Ascorbic Acid (VITAMIN C ER OR) Vitamin C      ZINC OR zinc      dilTIAZem HCl ER Coated Beads 240 MG Oral Capsule SR 24 Hr Take 1 capsule (240 mg total) by mouth daily. 90 capsule 3    Rosuvastatin Calcium 10 MG Oral Tab Take 1 tablet (10 mg total) by mouth nightly. 90 tablet 2    Coenzyme Q10 (COQ-10) 10 MG Oral Cap Take 1 capsule by mouth.      mupirocin 2 % External Ointment  (Patient not taking: Reported on 2/7/2025)        Allergies:   No Known Allergies    Past Medical History:    Arrhythmia    Gilbert disease    Hyperlipidemia     Past Surgical History:   Procedure Laterality Date    Oral surgery      Tonsillectomy       Social History     Socioeconomic History    Marital status:      Spouse name: Not on file    Number of children: Not on file    Years of education: Not on file    Highest education level: Not on file   Occupational History    Not on file   Tobacco Use    Smoking status: Former     Types: Cigarettes    Smokeless tobacco: Never    Tobacco comments:     as a teen   Vaping Use    Vaping status: Never Used   Substance and Sexual Activity    Alcohol use: Yes     Comment: occasionally    Drug use: No    Sexual activity: Not on file   Other Topics Concern    Grew up on a farm Not Asked    History of tanning Yes    Outdoor occupation Not Asked    Breast feeding Not Asked    Reaction to local anesthetic No    Pt has a pacemaker No    Pt has a defibrillator No   Social History Narrative    Not on file     Social Drivers of Health     Food Insecurity: Not on file   Transportation Needs: Not on file   Stress: Not on file   Housing Stability: At Risk (8/18/2023)    Received from [employer], [facility]     Living Situation: Not on file     Housing Problems: Not on file     Family History   Problem Relation Age of Onset    Ulcerative Colitis Mother     Other (Renal cell cancer) Father     Other (cad) Brother        There were no vitals filed for this visit.    HPI:  Chief Complaint   Patient presents with    Upper Body Exam     LOV 10/24/24. Pt presents for UBSE. Pt has lesion on rt forehead, splinter on rt hand palm that is sore.    Pt has hx of AK  Pt denies personal/family hx of Skin Ca     Patient with recent episode of COVID again has been slowly recovering    Follow-up history of actinic keratoses.    Has been careful sun protection.  Patient has any particular lesions of concern. had seen   Geo previously.  Many lesions frozen with past, history of skin cancer    Patient presents with concerns above.    History of Present Illness  Tonya Mcclellan is a 74 year old female who presents with dermatological concerns regarding skin lesions and treatment follow-up.    She has skin lesions, including precancerous spots and age-related changes, primarily on the right temple and orbit. She has been using a prescribed cream, which has improved her skin condition. She has completed a six-week course of the cream and is considering another round as she found it beneficial and had no adverse reactions. She applied the cream to her cheeks, nose, and around the eyebrows, particularly focusing on the right side and below the left eye orbit. She uses face mist to avoid touching the treated areas.    Additionally, she experienced a recent incident involving a splinter, likely from a lynda bush, which caused redness and puffiness on her hand. She attempted to remove it by soaking the area and applying pressure, which led to some discomfort. She has been applying antibiotic spray to the affected area and notes that it does not appear infected.      Patient has been in their usual state of health.     Past notes/ records and appropriate/relevant lab results including pathology and past body maps reviewed. Including outside notes/ PCP notes as appropriate. Updated and new information noted in current visit.     ROS:  Denies other relevant systemic complaints.      History, medications, allergies reviewed as noted.       Physical Examination:     Well-developed well-nourished patient alert oriented in no acute distress.  Exam performed, including scalp, head, neck, face,nails, hair, external eyes, including conjunctival mucosa, eyelids, lips external ears , arms, digits,palms.     Multiple light to medium brown, well marginated, uniformly pigmented, macules and papules 6 mm and less scattered on exam. pigmented lesions  examined with dermoscopy benign-appearing patterns.     Waxy tannish keratotic papules scattered, cherry-red vascular papules scattered.    See map today's date for lesions noted .  See assessment and plan below for specific lesions.    Otherwise remarkable for lesions as noted on map.    See A/P  below for additional information:    Assessment / plan:    No orders of the defined types were placed in this encounter.      Meds & Refills for this Visit:  Requested Prescriptions     Signed Prescriptions Disp Refills    Imiquimod 5 % External Cream 12 each 1     Sig: Apply topically 2 times every week to actinic keratoses on forehead/brows, cheeks, nose x 6 weeks.    Ammonium Lactate 12 % External Cream 385 g 11     Sig: Apply to dry skin bid as directed         Encounter Diagnoses   Name Primary?    AK (actinic keratosis) Yes    Solar lentigo     Multiple melanocytic nevi     Seborrheic keratoses     Multiple nevi     Benign neoplasm of skin, unspecified location     Sun-damaged skin     Skin cancer screening          Patient seen for follow-up long-term monitoring, treatment of    Plan of care:  ongoing surveillance, monitoring including regular follow-up due to longer term risk of recurrence, new lesions.  See previous notes.  There is a longitudinal care relationship with me, the care plan reflects the ongoing nature of the continuous relationship of care, and the medical record indicates that there is ongoing treatment of a serious/complex medical condition which I am currently managing.  is Applicable       Assessment & Plan  Actinic Keratosis  Presents with multiple actinic keratoses on the right temple orbit, nose, and cheeks. Previous treatment with imiquimod cream showed improvement without adverse reactions. Discussed benefits of continuing imiquimod for further lesion reduction and improved skin appearance.  - Prescribe another round of imiquimod cream  - Instruct to apply cream to the right temple  orbit, nose, cheeks, and around the eyebrow earlier in the evening to avoid contact with surfaces  - Consider future treatment for the chest area if interested    Foreign Body (Splinter)  Reports a splinter in the finger, likely from a lynda bush. Area is red and puffy but not infected. Splinter was removed during the visit. Advised to continue soaking the area and applying antibiotic spray to prevent infection.  - Advise to continue soaking the area and applying antibiotic spray  - Monitor for signs of infection    General Health Maintenance  Discussed the importance of sunscreen use and hand washing. Recommended lactic acid lotion for dry skin on the chest.  - Advise to continue using sunscreen regularly  - Recommend lactic acid lotion for dry skin on the chest.      Foreign body right palm extracted  Erythematous scaling keratotic papules noted at sites noted on map  Actinic Keratoses.  Precancerous nature discussed. Sun protection, sunscreen/ blocks encouraged Lesions treated with cryo- .  Biopsy if not resolved.    X4 temples right forehead supra brow    Actinic keratoses.  Precancerous nature discussed.  Treatment options reviewed at length we will proceed with topical therapy with    them on     twice weekly for   6   week course  of actual medication use and may alternate weeks if necessary.  Increased redness scaling crusting irritation pain tenderness potential discussed.  Anticipate one month for resolution of symptoms.  Plan recheck in one month after completion of treatment course.  Consider alternative treatment biopsy if not resolved.  Repeat imiquimod to right infraorbital cheek    Actinic Keratoses.  Precancerous nature discussed. Sun protection, sunscreen/ blocks encouraged .  Monitoring for new lesions.  Sun damage additional recurrent and new actinic keratoses, skin cancers may occur in areas of prior actinic keratoses, related to past sun exposure to minimize current sun exposure.  Sunscreen  applied consistently regularly, reapplication and sun protection while driving recommended.  Lesions over the mid cheeks monitor     add lactic acid to areas of background AK's sun damage lentigines keratoses over the arms    Psoriasiform dermatitis over the upper arms  Topicals reviewed      Exam otherwise unchanged  Numerous SKs irritated over the temples chest upper back  Other waxy tan papules, lentigines over the back shoulders, more inflamed lesion at left shoulder  Reassurance consider trial of cryo    Scattered other benign keratoses lentigines   Waxy tan keratotic papules lesions in areas of concern as noted reassurance given.  Benign nature discussed.  Possibility of cryo, alphahydroxy acids over-the-counter retinol's discussed.  Lentigines, sun damage continue sun protection regular skin checks      Split earlobe follow-up with Dr. Sabillon for repair right earlobe  No other susupicious lesions on todays  exam.    Follow-up for full skin exam 6 months    Please refer to map for specific lesions.  See additional diagnoses.  Pros cons of various therapies, risks benefits discussed.Pathophysiology discussed with patient.  Therapeutic options reviewed.  See  Medications in grid.  Instructions reviewed at length.    Benign nevi, seborrheic  keratoses, cherry angiomas:  Reassurance regarding other benign skin lesions.    Monitor for new or changing lesions. Signs and symptoms of skin cancer, ABCDE's of melanoma ( additional information available at AAD.org, skincancer.org) Encourage Sunscreen (broad-spectrum, ideally mineral-based-UVA/UVB -SPF 30 or higher) use encouraged, sun protection/sun protective clothing, self exams reviewed Followup as noted RTC ---routine checkup 6 mos -one year or p.r.n.    Encounter Times   Including precharting, reviewing chart, prior notes obtaining history: 10 minutes, medical exam :10 minutes, notes on body map, plan, counseling 10minutes My total time spent caring for the patient  on the day of the encounter: 30 minutes     The patient indicates understanding of these issues and agrees to the plan.  The patient is asked to return as noted in follow-up/ above.    This note was generated using Dragon voice recognition software.  Please contact me regarding any confusion resulting from errors in recognition..  Note to patient and family: The 21st Century Cures Act makes medical notes like these available to patients. However, be advised this is a medical document. It is intended as [id number] communication and monitoring of a patient's care needs. It is written in medical language and may contain abbreviations or verbiage that are unfamiliar. It may appear blunt or direct. Medical documents are intended to carry relevant information, facts as evident and the clinical opinion of the practitioner.

## 2025-06-11 ENCOUNTER — OFFICE VISIT (OUTPATIENT)
Dept: DERMATOLOGY CLINIC | Facility: CLINIC | Age: 75
End: 2025-06-11

## 2025-06-11 DIAGNOSIS — D48.5 NEOPLASM OF UNCERTAIN BEHAVIOR OF SKIN: Primary | ICD-10-CM

## 2025-06-11 PROCEDURE — 99213 OFFICE O/P EST LOW 20 MIN: CPT | Performed by: DERMATOLOGY

## 2025-06-11 PROCEDURE — 11102 TANGNTL BX SKIN SINGLE LES: CPT | Performed by: DERMATOLOGY

## 2025-06-11 PROCEDURE — 1159F MED LIST DOCD IN RCRD: CPT | Performed by: DERMATOLOGY

## 2025-06-11 PROCEDURE — 1126F AMNT PAIN NOTED NONE PRSNT: CPT | Performed by: DERMATOLOGY

## 2025-06-11 PROCEDURE — 88305 TISSUE EXAM BY PATHOLOGIST: CPT | Performed by: DERMATOLOGY

## 2025-06-11 PROCEDURE — 1160F RVW MEDS BY RX/DR IN RCRD: CPT | Performed by: DERMATOLOGY

## 2025-06-11 NOTE — PROGRESS NOTES
The following individual(s) verbally consented to be recorded using ambient AI listening technology and understand that they can each withdraw their consent to this listening technology at any point by asking the clinician to turn off or pause the recording:    Patient name: Tonya Mcclellan  Additional names:

## 2025-06-15 NOTE — PROGRESS NOTES
Operative Report                     Shave/  Tangential biopsy     Clinical diagnosis:    Size of lesion:    Location:    Procedure:    With patient in appropriate position the skin of the above was scrubbed with alcohol.  Anesthesia was obtained with 1% Xylocaine with epinephrine.  The skin surrounding the lesion was placed under tension and the lesion was incised using a #15 scalpel blade.  The specimen was sent for histopathologic exam.    Hemostasis was obtained with electrocautery/aluminum chloride.  Estimated blood loss less than 2 cc.    Biopsy dressed with Polysporin, bandage.    Pressure dressing:   No    Complications: None    Written instructions given and reviewed with patient    Await pathology    Contact information reviewed.    Procedural physician:  Mackenzie Reinoso MD      Operative Report                     Shave/  Tangential biopsy     Clinical diagnosis:    Size of lesion:    Location:Dark purple red papule new r/o atypical pigmented lesion vs other right upper chest/medial breast     Procedure:    With patient in appropriate position the skin of the above was scrubbed with alcohol.  Anesthesia was obtained with 1% Xylocaine with epinephrine.  The skin surrounding the lesion was placed under tension and the lesion was incised using a #15 scalpel blade.  The specimen was sent for histopathologic exam.    Hemostasis was obtained with electrocautery/aluminum chloride.  Estimated blood loss less than 2 cc.    Biopsy dressed with Polysporin, bandage.    Pressure dressing:   No    Complications: None    Written instructions given and reviewed with patient    Await pathology    Contact information reviewed.    Procedural physician:  Mackenzie Reinoso MD

## 2025-06-15 NOTE — PROGRESS NOTES
Tonya Mcclellan is a 74 year old female.  HPI:     CC:    Chief Complaint   Patient presents with    Lesion     Hx of Aks.  LOV 2/2025.  Pt presents for lesions f/u to the forehead, brows, cheeks, and nose post Imiquimod therapy for 6 weeks.  Pt feels areas have improved but still areas of rough areas near the nose and right cheeks.     Full Skin Exam     Hx of Aks.  LOV 2/2025.  Pt presents for FBSE. Lesion(S) of concern to the   1. Chest- Denies bleeding or pain           Allergies:  Patient has no known allergies.    HISTORY:    Past Medical History:    Arrhythmia    Gilbert disease    Hyperlipidemia      Past Surgical History:   Procedure Laterality Date    Oral surgery      Tonsillectomy        Family History   Problem Relation Age of Onset    Ulcerative Colitis Mother     Other (Renal cell cancer) Father     Other (cad) Brother       Social History     Socioeconomic History    Marital status:    Tobacco Use    Smoking status: Former     Types: Cigarettes    Smokeless tobacco: Never    Tobacco comments:     as a teen   Vaping Use    Vaping status: Never Used   Substance and Sexual Activity    Alcohol use: Yes     Comment: occasionally    Drug use: No   Other Topics Concern    Grew up on a farm No    History of tanning Yes    Outdoor occupation No    Breast feeding No    Reaction to local anesthetic Yes     Comment: paplitations    Pt has a pacemaker No    Pt has a defibrillator No     Social Drivers of Health      Received from [facility]        Current Outpatient Medications   Medication Sig Dispense Refill    Ammonium Lactate 12 % External Cream Apply to dry skin bid as directed 385 g 11    Ascorbic Acid (VITAMIN C ER OR) Vitamin C      ZINC OR zinc      dilTIAZem HCl ER Coated Beads 240 MG Oral Capsule SR 24 Hr Take 1 capsule (240 mg total) by mouth daily. 90 capsule 3    Rosuvastatin Calcium 10 MG Oral Tab Take 1 tablet (10 mg total) by mouth nightly. 90 tablet 2    Coenzyme Q10  (COQ-10) 10 MG Oral Cap Take 1 capsule by mouth.      Imiquimod 5 % External Cream Apply topically 2 times every week to actinic keratoses on forehead/brows, cheeks, nose x 6 weeks. (Patient not taking: Reported on 6/11/2025) 12 each 1    mupirocin 2 % External Ointment  (Patient not taking: Reported on 6/11/2025)       Allergies:   No Known Allergies    Past Medical History:    Arrhythmia    Gilbert disease    Hyperlipidemia     Past Surgical History:   Procedure Laterality Date    Oral surgery      Tonsillectomy       Social History     Socioeconomic History    Marital status:      Spouse name: Not on file    Number of children: Not on file    Years of education: Not on file    Highest education level: Not on file   Occupational History    Not on file   Tobacco Use    Smoking status: Former     Types: Cigarettes    Smokeless tobacco: Never    Tobacco comments:     as a teen   Vaping Use    Vaping status: Never Used   Substance and Sexual Activity    Alcohol use: Yes     Comment: occasionally    Drug use: No    Sexual activity: Not on file   Other Topics Concern    Grew up on a farm No    History of tanning Yes    Outdoor occupation No    Breast feeding No    Reaction to local anesthetic Yes     Comment: paplitations    Pt has a pacemaker No    Pt has a defibrillator No   Social History Narrative    Not on file     Social Drivers of Health     Food Insecurity: Not on file   Transportation Needs: Not on file   Stress: Not on file   Housing Stability: At Risk (8/18/2023)    Received from [facility]     Living Situation: Not on file     Housing Problems: Not on file     Family History   Problem Relation Age of Onset    Ulcerative Colitis Mother     Other (Renal cell cancer) Father     Other (cad) Brother        There were no vitals filed for this visit.    HPI:  Chief Complaint   Patient presents with    Lesion     Hx of Aks.  LOV 2/2025.  Pt presents for lesions f/u to the forehead, brows,  cheeks, and nose post Imiquimod therapy for 6 weeks.  Pt feels areas have improved but still areas of rough areas near the nose and right cheeks.     Full Skin Exam     Hx of Aks.  LOV 2/2025.  Pt presents for FBSE. Lesion(S) of concern to the   1. Chest- Denies bleeding or pain       Patient with recent episode of COVID again has been slowly recovering    Follow-up history of actinic keratoses.    Has been careful sun protection.  Patient has any particular lesions of concern. had seen Dr. Guardado previously.  Many lesions frozen with past, history of skin cancer    Patient presents with concerns above.    History of Present Illness  Tonya Mcclellan is a 74 year old female who presents with dermatological concerns regarding skin lesions and treatment follow-up.    She has skin lesions, including precancerous spots and age-related changes, primarily on the right temple and orbit. She has been using a prescribed cream, which has improved her skin condition. She has completed a six-week course of the cream and is considering another round as she found it beneficial and had no adverse reactions. She applied the cream to her cheeks, nose, and around the eyebrows, particularly focusing on the right side and below the left eye orbit. She uses face mist to avoid touching the treated areas.    Additionally, she experienced a recent incident involving a splinter, likely from a lynda bush, which caused redness and puffiness on her hand. She attempted to remove it by soaking the area and applying pressure, which led to some discomfort. She has been applying antibiotic spray to the affected area and notes that it does not appear infected.    6/25    Tonya Mcclellan is a 74 year old female who presents with concerns about skin lesions and age spots.    She has noticed an increase in age spots and benign keratoses, as well as a few skin tags. She describes these as 'barnacles' and clarifies that they are not dark spots but  rather age spots.    She is particularly concerned about a specific lesion that appears 'crusty' and 'solid', with a small scab in the center. It has a bump and appears dark. She recalls a purple spot that faded, possibly indicating a small bleed.    She has a history of frequent injuries, particularly twisting her feet while doing yard work. She mentions having two boots from previous injuries and recalls two shoulder injuries from slipping and falling.      Patient has been in their usual state of health.     Past notes/ records and appropriate/relevant lab results including pathology and past body maps reviewed. Including outside notes/ PCP notes as appropriate. Updated and new information noted in current visit.     ROS:  Denies other relevant systemic complaints.      History, medications, allergies reviewed as noted.       Physical Examination:     Well-developed well-nourished patient alert oriented in no acute distress.  Exam performed, including scalp, head, neck, face,nails, hair, external eyes, including conjunctival mucosa, eyelids, lips external ears , arms, digits,palms.     Multiple light to medium brown, well marginated, uniformly pigmented, macules and papules 6 mm and less scattered on exam. pigmented lesions examined with dermoscopy benign-appearing patterns.     Waxy tannish keratotic papules scattered, cherry-red vascular papules scattered.    See map today's date for lesions noted .  See assessment and plan below for specific lesions.    Otherwise remarkable for lesions as noted on map.    See A/P  below for additional information:    Assessment / plan:    Orders Placed This Encounter   Procedures    Specimen to Pathology, Tissue [[facility] Pt to [facility] lab]       Meds & Refills for this Visit:  Requested Prescriptions      No prescriptions requested or ordered in this encounter         Encounter Diagnosis   Name Primary?    Neoplasm of uncertain behavior of skin Yes         Patient seen for  follow-up long-term monitoring, treatment of  Actinic keratoses sun damage medication monitoring  Plan of care:  ongoing surveillance, monitoring including regular follow-up due to longer term risk of recurrence, new lesions.  See previous notes.  There is a longitudinal care relationship with me, the care plan reflects the ongoing nature of the continuous relationship of care, and the medical record indicates that there is ongoing treatment of a serious/complex medical condition which I am currently managing.  is Applicable       Assessment & Plan  Actinic Keratosis  Presents with multiple actinic keratoses on the right temple orbit, nose, and cheeks. Previous treatment with imiquimod cream showed improvement without adverse reactions. Discussed benefits of continuing imiquimod for further lesion reduction and improved skin appearance.  - Prescribe another round of imiquimod cream  - Instruct to apply cream to the right temple orbit, nose, cheeks, and around the eyebrow earlier in the evening to avoid contact with surfaces  - Consider future treatment for the chest area if interested    Foreign Body (Splinter)    General Health Maintenance  Discussed the importance of sunscreen use and hand washing. Recommended lactic acid lotion for dry skin on the chest.  - Advise to continue using sunscreen regularly  - Recommend lactic acid lotion for dry skin on the chest.      Benign keratosis  Benign keratoses present with some lesions appearing crusty and solid, raising concern for potential trauma or bleeding. Differential includes possible bruising or traumatized angioma. A specific lesion on the chest has a scab in the center and a purplish hue, warranting further observation.  - Monitor for changes in appearance or symptoms.  - Consider intervention if significant changes occur.    Skin tags  Multiple skin tags present without significant changes or concerns.    Recording duration: 5 minutes              Dark  purple red papule new r/o atypical pigmented lesion vs other right upper chest/medial breast   Shave/ tangential biopsy performed, operative note and consent in chart further plans pending pathology    Actinic keratoses.  Precancerous nature discussed.  Treatment options reviewed at length we will proceed with topical therapy with    them on     twice weekly for   6   week course  of actual medication use and may alternate weeks if necessary.  Increased redness scaling crusting irritation pain tenderness potential discussed.  Anticipate one month for resolution of symptoms.  Plan recheck in one month after completion of treatment course.  Consider alternative treatment biopsy if not resolved.  Repeat imiquimod to right infraorbital cheek       add lactic acid to areas of background AK's sun damage lentigines keratoses over the arms    Psoriasiform dermatitis over the upper arms  Topicals reviewed      Exam otherwise unchanged  Numerous SKs irritated over the temples chest upper back  Other waxy tan papules, lentigines over the back shoulders, more inflamed lesion at left shoulder  Reassurance consider trial of cryo    Scattered other benign keratoses lentigines   Waxy tan keratotic papules lesions in areas of concern as noted reassurance given.  Benign nature discussed.  Possibility of cryo, alphahydroxy acids over-the-counter retinol's discussed.  Lentigines, sun damage continue sun protection regular skin checks      Split earlobe follow-up with Dr. Sabillon for repair right earlobe  No other susupicious lesions on todays  exam.    Follow-up for full skin exam 6 months    Please refer to map for specific lesions.  See additional diagnoses.  Pros cons of various therapies, risks benefits discussed.Pathophysiology discussed with patient.  Therapeutic options reviewed.  See  Medications in grid.  Instructions reviewed at length.    Benign nevi, seborrheic  keratoses, cherry angiomas:  Reassurance regarding other benign  skin lesions.    Monitor for new or changing lesions. Signs and symptoms of skin cancer, ABCDE's of melanoma ( additional information available at AAD.org, skincancer.org) Encourage Sunscreen (broad-spectrum, ideally mineral-based-UVA/UVB -SPF 30 or higher) use encouraged, sun protection/sun protective clothing, self exams reviewed Followup as noted RTC ---routine checkup 6 mos -one year or p.r.n.    Encounter Times   Including precharting, reviewing chart, prior notes obtaining history: 10 minutes, medical exam :10 minutes, notes on body map, plan, counseling 10minutes My total time spent caring for the patient on the day of the encounter: 30 minutes     The patient indicates understanding of these issues and agrees to the plan.  The patient is asked to return as noted in follow-up/ above.    This note was generated using Dragon voice recognition software.  Please contact me regarding any confusion resulting from errors in recognition..  Note to patient and family: The 21st Century Cures Act makes medical notes like these available to patients. However, be advised this is a medical document. It is intended as noek-ip-olpc communication and monitoring of a patient's care needs. It is written in medical language and may contain abbreviations or verbiage that are unfamiliar. It may appear blunt or direct. Medical documents are intended to carry relevant information, facts as evident and the clinical opinion of the practitioner.

## 2025-06-16 NOTE — PROGRESS NOTES
The pathology report from last visit showed    right upper chest/medial breast, shave biopsy:  - Seborrheic keratosis, irritated with hemorrhage.  Please log in test results, send biopsy results letter.  Pt to rtc 1 year or prn.

## (undated) NOTE — LETTER
9/3/2021              [address] [place] [address] [address]        [place] [address]         Dear Zaid Lewis,      The report of the Biopsy done on 8/31/21 shows a melanocytic nevus.   This is a benign (not cancerous) growth, and requires no further treat

## (undated) NOTE — ED AVS SNAPSHOT
Vasyl Stanford   MRN: [id number]    Department:  [facility] Emergency Department   Date of Visit:  9/7/2018           Disclosure     Insurance plans vary and the physician(s) referred by the ER may not be covered by your plan.  Please contact within the next three months to obtain basic health screening including reassessment of your blood pressure.     IF THERE IS ANY CHANGE OR WORSENING OF YOUR CONDITION, CALL YOUR PRIMARY CARE PHYSICIAN AT ONCE OR RETURN IMMEDIATELY TO THE EMERGENCY DEPARTMEN